# Patient Record
Sex: MALE | Race: WHITE | NOT HISPANIC OR LATINO | Employment: OTHER | URBAN - METROPOLITAN AREA
[De-identification: names, ages, dates, MRNs, and addresses within clinical notes are randomized per-mention and may not be internally consistent; named-entity substitution may affect disease eponyms.]

---

## 2017-05-12 ENCOUNTER — TELEPHONE (OUTPATIENT)
Dept: UROLOGY | Facility: CLINIC | Age: 77
End: 2017-05-12

## 2017-05-12 NOTE — TELEPHONE ENCOUNTER
Reviewed recent path from Dr Diaz  Left a message with Dr Diaz that he should consider re-resection of the area or high grade disease and ask pathologist to specify if there is lamina propria invasion.  I offer to see Mr Marie if he or Mr Marie would like me to see him

## 2017-06-02 ENCOUNTER — HOSPITAL ENCOUNTER (OUTPATIENT)
Dept: RADIOLOGY | Facility: OTHER | Age: 77
Discharge: HOME | End: 2017-06-02
Attending: UROLOGY
Payer: MEDICARE

## 2017-06-02 ENCOUNTER — HOSPITAL ENCOUNTER (OUTPATIENT)
Dept: PREADMISSION TESTING | Facility: OTHER | Age: 77
Discharge: HOME | End: 2017-06-02
Attending: UROLOGY
Payer: MEDICARE

## 2017-06-02 ENCOUNTER — OFFICE VISIT (OUTPATIENT)
Dept: UROLOGY | Facility: CLINIC | Age: 77
End: 2017-06-02
Attending: UROLOGY
Payer: MEDICARE

## 2017-06-02 ENCOUNTER — ANESTHESIA EVENT (OUTPATIENT)
Dept: SURGERY | Facility: OTHER | Age: 77
End: 2017-06-02
Payer: MEDICARE

## 2017-06-02 VITALS
WEIGHT: 205 LBS | DIASTOLIC BLOOD PRESSURE: 78 MMHG | OXYGEN SATURATION: 98 % | HEIGHT: 71 IN | BODY MASS INDEX: 28.7 KG/M2 | RESPIRATION RATE: 18 BRPM | TEMPERATURE: 99 F | HEART RATE: 77 BPM | SYSTOLIC BLOOD PRESSURE: 138 MMHG

## 2017-06-02 VITALS
WEIGHT: 205 LBS | SYSTOLIC BLOOD PRESSURE: 137 MMHG | BODY MASS INDEX: 28.7 KG/M2 | DIASTOLIC BLOOD PRESSURE: 77 MMHG | HEIGHT: 71 IN | HEART RATE: 74 BPM

## 2017-06-02 DIAGNOSIS — C67.5 MALIGNANT NEOPLASM OF BLADDER NECK: ICD-10-CM

## 2017-06-02 DIAGNOSIS — R82.90 ABNORMAL FINDING IN URINE: ICD-10-CM

## 2017-06-02 DIAGNOSIS — C67.5 MALIGNANT NEOPLASM OF BLADDER NECK: Primary | ICD-10-CM

## 2017-06-02 LAB
ANION GAP SERPL CALC-SCNC: 12 MMOL/L
BASOPHILS # BLD AUTO: 0.03 K/UL
BASOPHILS NFR BLD: 0.3 %
BILIRUB SERPL-MCNC: ABNORMAL MG/DL
BLOOD URINE, POC: 50
BUN SERPL-MCNC: 26 MG/DL
CALCIUM SERPL-MCNC: 10.4 MG/DL
CHLORIDE SERPL-SCNC: 109 MMOL/L
CO2 SERPL-SCNC: 24 MMOL/L
COLOR, POC UA: YELLOW
CREAT SERPL-MCNC: 2.1 MG/DL
DIFFERENTIAL METHOD: ABNORMAL
EOSINOPHIL # BLD AUTO: 0.6 K/UL
EOSINOPHIL NFR BLD: 5.9 %
ERYTHROCYTE [DISTWIDTH] IN BLOOD BY AUTOMATED COUNT: 13 %
EST. GFR  (AFRICAN AMERICAN): 34 ML/MIN/1.73 M^2
EST. GFR  (NON AFRICAN AMERICAN): 30 ML/MIN/1.73 M^2
GLUCOSE SERPL-MCNC: 99 MG/DL
GLUCOSE UR QL STRIP: ABNORMAL
HCT VFR BLD AUTO: 44.7 %
HGB BLD-MCNC: 14.8 G/DL
KETONES UR QL STRIP: ABNORMAL
LEUKOCYTE ESTERASE URINE, POC: ABNORMAL
LYMPHOCYTES # BLD AUTO: 3.6 K/UL
LYMPHOCYTES NFR BLD: 36.8 %
MCH RBC QN AUTO: 30.1 PG
MCHC RBC AUTO-ENTMCNC: 33.1 %
MCV RBC AUTO: 91 FL
MONOCYTES # BLD AUTO: 0.8 K/UL
MONOCYTES NFR BLD: 8.7 %
NEUTROPHILS # BLD AUTO: 4.6 K/UL
NEUTROPHILS NFR BLD: 48 %
NITRITE, POC UA: ABNORMAL
PH, POC UA: 7
PLATELET # BLD AUTO: 179 K/UL
PMV BLD AUTO: 9.1 FL
POTASSIUM SERPL-SCNC: 5.2 MMOL/L
PROTEIN, POC: ABNORMAL
RBC # BLD AUTO: 4.92 M/UL
SODIUM SERPL-SCNC: 145 MMOL/L
SPECIFIC GRAVITY, POC UA: 1.01
UROBILINOGEN, POC UA: ABNORMAL
WBC # BLD AUTO: 9.67 K/UL

## 2017-06-02 PROCEDURE — 36415 COLL VENOUS BLD VENIPUNCTURE: CPT

## 2017-06-02 PROCEDURE — 85025 COMPLETE CBC W/AUTO DIFF WBC: CPT

## 2017-06-02 PROCEDURE — 71020 XR CHEST PA AND LATERAL PRE-OP: CPT | Mod: 26,,, | Performed by: RADIOLOGY

## 2017-06-02 PROCEDURE — 80048 BASIC METABOLIC PNL TOTAL CA: CPT

## 2017-06-02 PROCEDURE — 99205 OFFICE O/P NEW HI 60 MIN: CPT | Mod: S$PBB,,, | Performed by: UROLOGY

## 2017-06-02 PROCEDURE — 88112 CYTOPATH CELL ENHANCE TECH: CPT | Performed by: PATHOLOGY

## 2017-06-02 PROCEDURE — 99999 PR PBB SHADOW E&M-EST. PATIENT-LVL IV: CPT | Mod: PBBFAC,,, | Performed by: UROLOGY

## 2017-06-02 PROCEDURE — 71020 XR CHEST PA AND LATERAL PRE-OP: CPT | Mod: TC

## 2017-06-02 RX ORDER — PHENAZOPYRIDINE HYDROCHLORIDE 200 MG/1
200 TABLET, FILM COATED ORAL
COMMUNITY
Start: 2016-12-16 | End: 2017-06-02

## 2017-06-02 RX ORDER — TAMSULOSIN HYDROCHLORIDE 0.4 MG/1
0.4 CAPSULE ORAL DAILY
Refills: 0 | COMMUNITY
Start: 2017-05-15

## 2017-06-02 RX ORDER — PHENAZOPYRIDINE HYDROCHLORIDE 200 MG/1
200 TABLET, FILM COATED ORAL 3 TIMES DAILY
Refills: 0 | Status: ON HOLD | COMMUNITY
Start: 2017-05-12 | End: 2017-06-13 | Stop reason: HOSPADM

## 2017-06-02 RX ORDER — FLUTICASONE FUROATE AND VILANTEROL 100; 25 UG/1; UG/1
1 POWDER RESPIRATORY (INHALATION)
COMMUNITY

## 2017-06-02 RX ORDER — GABAPENTIN 100 MG/1
100 CAPSULE ORAL DAILY
Refills: 0 | COMMUNITY
Start: 2017-05-16 | End: 2017-06-23

## 2017-06-02 RX ORDER — DOXYCYCLINE 100 MG/1
TABLET ORAL
Refills: 0 | COMMUNITY
Start: 2017-03-02 | End: 2017-06-02

## 2017-06-02 RX ORDER — AZELASTINE HYDROCHLORIDE, FLUTICASONE PROPIONATE 137; 50 UG/1; UG/1
1 SPRAY, METERED NASAL
COMMUNITY
Start: 2017-03-20 | End: 2017-06-23

## 2017-06-02 RX ORDER — NAPROXEN SODIUM 220 MG
220 TABLET ORAL 2 TIMES DAILY WITH MEALS
COMMUNITY
End: 2017-06-23

## 2017-06-02 RX ORDER — LIDOCAINE HYDROCHLORIDE 10 MG/ML
1 INJECTION, SOLUTION EPIDURAL; INFILTRATION; INTRACAUDAL; PERINEURAL ONCE
Status: CANCELLED | OUTPATIENT
Start: 2017-06-02 | End: 2017-06-02

## 2017-06-02 RX ORDER — FAMOTIDINE 20 MG/1
20 TABLET, FILM COATED ORAL
Status: CANCELLED | OUTPATIENT
Start: 2017-06-02 | End: 2017-06-02

## 2017-06-02 RX ORDER — OFLOXACIN 3 MG/ML
SOLUTION AURICULAR (OTIC)
Refills: 0 | COMMUNITY
Start: 2017-03-20 | End: 2017-06-02

## 2017-06-02 RX ORDER — SODIUM CHLORIDE, SODIUM LACTATE, POTASSIUM CHLORIDE, CALCIUM CHLORIDE 600; 310; 30; 20 MG/100ML; MG/100ML; MG/100ML; MG/100ML
INJECTION, SOLUTION INTRAVENOUS CONTINUOUS
Status: CANCELLED | OUTPATIENT
Start: 2017-06-02

## 2017-06-02 RX ORDER — ALBUTEROL SULFATE 90 UG/1
AEROSOL, METERED RESPIRATORY (INHALATION)
Refills: 0 | COMMUNITY
Start: 2017-04-05 | End: 2018-08-20

## 2017-06-02 RX ORDER — MOMETASONE FUROATE 50 UG/1
2 SPRAY, METERED NASAL
COMMUNITY
Start: 2016-03-16 | End: 2017-06-02

## 2017-06-02 RX ORDER — ALBUTEROL SULFATE 0.83 MG/ML
2.5 SOLUTION RESPIRATORY (INHALATION)
Status: CANCELLED | OUTPATIENT
Start: 2017-06-02 | End: 2017-06-02

## 2017-06-02 NOTE — DISCHARGE INSTRUCTIONS
PRE-ADMIT TESTING -  [phone]    [address]  [facility]        OUTPATIENT SURGERY UNIT - [phone]    Your surgery has been scheduled at [facility]. We are pleased to have the opportunity to serve you. For Further Information please call [phone].    On the day of surgery please report to the Information Desk on the 1st floor.    · CONTACT YOUR PHYSICIAN'S OFFICE THE DAY PRIOR TO YOUR SURGERY TO OBTAIN YOUR ARRIVAL TIME.     · The evening before surgery do not eat anything after 9 p.m. ( this includes hard candy, chewing gum and mints).  You may only have GATORADE, POWERADE AND WATER  from 9 p.m. until you leave your home.   DO NOT DRINK ANY LIQUIDS ON THE WAY TO THE HOSPITAL.      SPECIAL MEDICATION INSTRUCTIONS: TAKE medications checked off by the Anesthesiologist on your Medication List.    Angiogram Patients: Take medications as instructed by your physician, including aspirin.     Surgery Patients:    If you take ASPIRIN - Your PHYSICIAN/SURGEON will need to inform you IF/OR when you need to stop taking aspirin prior to your surgery.     Do Not take any medications containing IBUPROFEN.  Do Not Wear any make-up or dark nail polish   (especially eye make-up) to surgery. If you come to surgery with makeup on you will be required to remove the makeup or nail polish.    Do not shave your surgical area at least 5 days prior to your surgery. The surgical prep will be performed at the hospital according to Infection Control regulations.    Leave all valuables at home.   Do Not wear any jewelry or watches, including any metal in body piercings.  Contact Lens must be removed before surgery. Either do not wear the contact lens or bring a case and solution for storage.  Please bring a container for eyeglasses or dentures as required.  Bring any paperwork your physician has provided, such as consent forms,  history and physicals, doctor's orders, etc.   Bring comfortable clothes  that are loose fitting to wear upon discharge. Take into consideration the type of surgery being performed.  Maintain your diet as advised per your physician the day prior to surgery.      Adequate rest the night before surgery is advised.   Park in the Parking lot behind the hospital or in the [facility] across the street from the parking lot. Parking is complimentary.  If you will be discharged the same day as your procedure, please arrange for a responsible adult to drive you home or to accompany you if traveling by taxi.   YOU WILL NOT BE PERMITTED TO DRIVE OR TO LEAVE THE HOSPITAL ALONE AFTER SURGERY.   It is strongly recommended that you arrange for someone to remain with you for the first 24 hrs following your surgery.       Thank you for your cooperation.  The Staff of [facility].        Bathing Instructions                                                                 Please shower the evening before and morning of your procedure with    ANTIBACTERIAL SOAP. ( DIAL, etc )  Concentrate on the surgical area   for at least 3 minutes and rinse completely. Dry off as usual.   Do not use any deodorant, powder, body lotions, perfume, after shave or    cologne.

## 2017-06-02 NOTE — LETTER
June 2, 2017      MILADY Diaz III, MD  [address]  [address]  [facility] [address]           [facility] - Urology  [address] [place] [address], [address] [address]  Phone: [phone]          Patient: Connor Marie   MR Number: [id number]   YOB: 1940   Date of Visit: 6/2/2017       Dear Dr. MILADY Diaz III:    Thank you for referring Connor Marie to me for evaluation. Attached you will find relevant portions of my assessment and plan of care.    If you have questions, please do not hesitate to call me. I look forward to following Connor Marie along with you.    Sincerely,    Blas Brennan MD    Enclosure  CC:  No Recipients    If you would like to receive this communication electronically, please contact [email or web address] or [phone] to request more information on [employer] access.    For providers and/or their staff who would like to refer a patient to [facility], please contact us through our one-stop-shop provider referral line, [facility], at [phone].    If you feel you have received this communication in error or would no longer like to receive these types of communications, please e-mail [email or web address]

## 2017-06-02 NOTE — ANESTHESIA PREPROCEDURE EVALUATION
06/02/2017  Connor Marie is a 76 y.o., male.    Anesthesia Evaluation    I have reviewed the Patient Summary Reports.    I have reviewed the Nursing Notes.   I have reviewed the Medications.     Review of Systems  Anesthesia Hx:  Denies Family Hx of Anesthesia complications.   Denies Personal Hx of Anesthesia complications.   Social:  Former Smoker Quit 3 years   Hematology/Oncology:  Hematology Normal      Current/Recent Cancer. (prostate) --  Cancer in past history (s/p nephrectomy for renal CA):    EENT/Dental:EENT/Dental Normal   Cardiovascular:  Cardiovascular Normal Exercise tolerance: poor  Exercise limited by back pain   Pulmonary:   COPD (rescue inhaler 1-2x/week, non-compliant with BREO, told to take daily between now and surgery)    Renal/:  Renal/ Normal     Hepatic/GI:  Hepatic/GI Normal    Musculoskeletal:  Spine Disorders: lumbar Chronic Pain    Neurological:  Neurology Normal    Endocrine:   Diabetes, type 2    Dermatological:  Skin Normal    Psych:  Psychiatric Normal           Physical Exam  General:  Well nourished    Airway/Jaw/Neck:  Airway Findings: Mouth Opening: Normal Mallampati: II  TM Distance: Normal, at least 6 cm  Jaw/Neck Findings:  Neck ROM: Normal ROM      Dental:  Dental Findings: In tact, upper front caps        Mental Status:  Mental Status Findings:  Cooperative, Alert and Oriented         Anesthesia Plan  Type of Anesthesia, risks & benefits discussed:  Anesthesia Type:  general  Patient's Preference:   Intra-op Monitoring Plan: standard ASA monitors  Intra-op Monitoring Plan Comments:   Post Op Pain Control Plan:   Post Op Pain Control Plan Comments:   Induction:    Beta Blocker:         Informed Consent: Patient understands risks and agrees with Anesthesia plan.  Questions answered. Anesthesia consent signed with patient.  ASA Score: 3     Day of Surgery  Review of History & Physical:    H&P update referred to the surgeon.     Anesthesia Plan Notes: Outside labs sent for        Ready For Surgery From Anesthesia Perspective.

## 2017-06-02 NOTE — PATIENT INSTRUCTIONS
Transurethral Resection of the Prostate (TURP): Hospital Recovery    After surgery, youll first go to the recovery room, then to a regular hospital room.  In most cases, you wont go home until you can pass urine on your own. The hospital stay is usually 1-7 days.  Having a catheter  · To cleanse your bladder and prevent blood clots, a catheter is placed in your urethra. Fluid then flows into and out of your bladder through the tube. The fluid draining from the tube will be reddish. This is nothing to worry about.  · The catheter will usually remain in place for 1-3 days. While it is in, you may feel like you have to urinate. You may also feel cramps in your bladder. If the cramping bothers you or you are in pain, tell the nurse. He or she may be able to give you medication to help you feel better.  Urinating on your own  · In 1-3 days after surgery, the catheter is removed. This is done to see if you can urinate on your own.  · It is common to feel a burning sensation when you first pass urine. Also, the urine may still look reddish or pinkish. Tell the nurse if your urine is bright red.  · The amount you urinate may be measured. In most cases, you will go home when you can pass urine without the catheter. If you cant urinate on your own, you may go home with the tube still in place and a leg bag to collect the urine. In this case, youll return to the doctor later on to have the catheter removed.  Before going home  You will be told what to do while you heal. You may be given certain medications, such as antibiotics to prevent infection. Ask your doctor when you can start taking aspirin and other medications again. When you are ready to go home, have an adult friend or relative drive you.   Date Last Reviewed: 9/21/2014  © 0449-3601 [employer]. [address] [place] [address], [place] [address]. All rights reserved. This information is not intended as a substitute for professional medical care. Always  follow your healthcare professional's instructions.

## 2017-06-03 LAB — BACTERIA UR CULT: NO GROWTH

## 2017-06-05 NOTE — PLAN OF CARE
06/05/17 1252   ED Admissions Case Approval   ED Admissions Case Approval (!) CM Approved  (OP )     THIS IS AN OP PROCEDURE. PLEASE ADMIT OUTPATIENT

## 2017-06-12 ENCOUNTER — ANESTHESIA (OUTPATIENT)
Dept: SURGERY | Facility: OTHER | Age: 77
End: 2017-06-12
Payer: MEDICARE

## 2017-06-12 ENCOUNTER — HOSPITAL ENCOUNTER (OUTPATIENT)
Facility: OTHER | Age: 77
Discharge: HOME | End: 2017-06-13
Attending: UROLOGY | Admitting: UROLOGY
Payer: MEDICARE

## 2017-06-12 DIAGNOSIS — C67.5 MALIGNANT NEOPLASM OF BLADDER NECK: Primary | ICD-10-CM

## 2017-06-12 LAB
ANION GAP SERPL CALC-SCNC: 10 MMOL/L
BUN SERPL-MCNC: 26 MG/DL
CALCIUM SERPL-MCNC: 10.1 MG/DL
CHLORIDE SERPL-SCNC: 110 MMOL/L
CO2 SERPL-SCNC: 23 MMOL/L
CREAT SERPL-MCNC: 2.2 MG/DL
EST. GFR  (AFRICAN AMERICAN): 32 ML/MIN/1.73 M^2
EST. GFR  (NON AFRICAN AMERICAN): 28 ML/MIN/1.73 M^2
GLUCOSE SERPL-MCNC: 100 MG/DL
POCT GLUCOSE: 180 MG/DL (ref 70–110)
POCT GLUCOSE: 99 MG/DL (ref 70–110)
POTASSIUM SERPL-SCNC: 5.4 MMOL/L
SODIUM SERPL-SCNC: 143 MMOL/L

## 2017-06-12 PROCEDURE — 99900035 HC TECH TIME PER 15 MIN (STAT)

## 2017-06-12 PROCEDURE — 27201423 OPTIME MED/SURG SUP & DEVICES STERILE SUPPLY: Performed by: UROLOGY

## 2017-06-12 PROCEDURE — 25000003 PHARM REV CODE 250: Performed by: ANESTHESIOLOGY

## 2017-06-12 PROCEDURE — 74420 UROGRAPHY RTRGR +-KUB: CPT | Mod: 26,,, | Performed by: UROLOGY

## 2017-06-12 PROCEDURE — 37000009 HC ANESTHESIA EA ADD 15 MINS: Performed by: UROLOGY

## 2017-06-12 PROCEDURE — 25000242 PHARM REV CODE 250 ALT 637 W/ HCPCS: Performed by: ANESTHESIOLOGY

## 2017-06-12 PROCEDURE — 36000707: Performed by: UROLOGY

## 2017-06-12 PROCEDURE — C1758 CATHETER, URETERAL: HCPCS | Performed by: UROLOGY

## 2017-06-12 PROCEDURE — 71000039 HC RECOVERY, EACH ADD'L HOUR: Performed by: UROLOGY

## 2017-06-12 PROCEDURE — C1769 GUIDE WIRE: HCPCS | Performed by: UROLOGY

## 2017-06-12 PROCEDURE — 25000003 PHARM REV CODE 250: Performed by: NURSE ANESTHETIST, CERTIFIED REGISTERED

## 2017-06-12 PROCEDURE — 71000033 HC RECOVERY, INTIAL HOUR: Performed by: UROLOGY

## 2017-06-12 PROCEDURE — 88305 TISSUE EXAM BY PATHOLOGIST: CPT | Performed by: PATHOLOGY

## 2017-06-12 PROCEDURE — 25000003 PHARM REV CODE 250: Performed by: UROLOGY

## 2017-06-12 PROCEDURE — 94640 AIRWAY INHALATION TREATMENT: CPT

## 2017-06-12 PROCEDURE — 37000008 HC ANESTHESIA 1ST 15 MINUTES: Performed by: UROLOGY

## 2017-06-12 PROCEDURE — 82962 GLUCOSE BLOOD TEST: CPT | Performed by: UROLOGY

## 2017-06-12 PROCEDURE — 25500020 PHARM REV CODE 255: Performed by: UROLOGY

## 2017-06-12 PROCEDURE — 52005 CYSTO W/URTRL CATHJ: CPT | Mod: 59,,, | Performed by: UROLOGY

## 2017-06-12 PROCEDURE — 63600175 PHARM REV CODE 636 W HCPCS: Performed by: NURSE ANESTHETIST, CERTIFIED REGISTERED

## 2017-06-12 PROCEDURE — 36000706: Performed by: UROLOGY

## 2017-06-12 PROCEDURE — 80048 BASIC METABOLIC PNL TOTAL CA: CPT

## 2017-06-12 PROCEDURE — 88305 TISSUE EXAM BY PATHOLOGIST: CPT | Mod: 26,,, | Performed by: PATHOLOGY

## 2017-06-12 PROCEDURE — 94761 N-INVAS EAR/PLS OXIMETRY MLT: CPT

## 2017-06-12 PROCEDURE — 63600175 PHARM REV CODE 636 W HCPCS: Performed by: UROLOGY

## 2017-06-12 PROCEDURE — 52235 CYSTOSCOPY AND TREATMENT: CPT | Mod: ,,, | Performed by: UROLOGY

## 2017-06-12 RX ORDER — ALBUTEROL SULFATE 0.83 MG/ML
2.5 SOLUTION RESPIRATORY (INHALATION)
Status: COMPLETED | OUTPATIENT
Start: 2017-06-12 | End: 2017-06-12

## 2017-06-12 RX ORDER — DIPHENHYDRAMINE HYDROCHLORIDE 50 MG/ML
25 INJECTION INTRAMUSCULAR; INTRAVENOUS EVERY 6 HOURS PRN
Status: DISCONTINUED | OUTPATIENT
Start: 2017-06-12 | End: 2017-06-12 | Stop reason: HOSPADM

## 2017-06-12 RX ORDER — INSULIN ASPART 100 [IU]/ML
0-5 INJECTION, SOLUTION INTRAVENOUS; SUBCUTANEOUS
Status: DISCONTINUED | OUTPATIENT
Start: 2017-06-12 | End: 2017-06-13 | Stop reason: HOSPADM

## 2017-06-12 RX ORDER — ONDANSETRON 2 MG/ML
4 INJECTION INTRAMUSCULAR; INTRAVENOUS ONCE AS NEEDED
Status: DISCONTINUED | OUTPATIENT
Start: 2017-06-12 | End: 2017-06-12 | Stop reason: HOSPADM

## 2017-06-12 RX ORDER — FLUTICASONE FUROATE AND VILANTEROL 100; 25 UG/1; UG/1
1 POWDER RESPIRATORY (INHALATION) DAILY
Status: DISCONTINUED | OUTPATIENT
Start: 2017-06-12 | End: 2017-06-13 | Stop reason: HOSPADM

## 2017-06-12 RX ORDER — GABAPENTIN 100 MG/1
100 CAPSULE ORAL DAILY
Status: DISCONTINUED | OUTPATIENT
Start: 2017-06-12 | End: 2017-06-13 | Stop reason: HOSPADM

## 2017-06-12 RX ORDER — TAMSULOSIN HYDROCHLORIDE 0.4 MG/1
0.4 CAPSULE ORAL DAILY
Status: DISCONTINUED | OUTPATIENT
Start: 2017-06-12 | End: 2017-06-13 | Stop reason: HOSPADM

## 2017-06-12 RX ORDER — PROPOFOL 10 MG/ML
VIAL (ML) INTRAVENOUS
Status: DISCONTINUED | OUTPATIENT
Start: 2017-06-12 | End: 2017-06-12

## 2017-06-12 RX ORDER — FAMOTIDINE 20 MG/1
20 TABLET, FILM COATED ORAL
Status: COMPLETED | OUTPATIENT
Start: 2017-06-12 | End: 2017-06-12

## 2017-06-12 RX ORDER — HYDROMORPHONE HYDROCHLORIDE 2 MG/ML
0.4 INJECTION, SOLUTION INTRAMUSCULAR; INTRAVENOUS; SUBCUTANEOUS EVERY 5 MIN PRN
Status: DISCONTINUED | OUTPATIENT
Start: 2017-06-12 | End: 2017-06-12 | Stop reason: HOSPADM

## 2017-06-12 RX ORDER — GLYCOPYRROLATE 0.2 MG/ML
INJECTION INTRAMUSCULAR; INTRAVENOUS
Status: DISCONTINUED | OUTPATIENT
Start: 2017-06-12 | End: 2017-06-12

## 2017-06-12 RX ORDER — FENTANYL CITRATE 50 UG/ML
25 INJECTION, SOLUTION INTRAMUSCULAR; INTRAVENOUS EVERY 5 MIN PRN
Status: DISCONTINUED | OUTPATIENT
Start: 2017-06-12 | End: 2017-06-12 | Stop reason: HOSPADM

## 2017-06-12 RX ORDER — NEOSTIGMINE METHYLSULFATE 1 MG/ML
INJECTION, SOLUTION INTRAVENOUS
Status: DISCONTINUED | OUTPATIENT
Start: 2017-06-12 | End: 2017-06-12

## 2017-06-12 RX ORDER — ACETAMINOPHEN 10 MG/ML
INJECTION, SOLUTION INTRAVENOUS
Status: DISCONTINUED | OUTPATIENT
Start: 2017-06-12 | End: 2017-06-12

## 2017-06-12 RX ORDER — MEPERIDINE HYDROCHLORIDE 50 MG/ML
12.5 INJECTION INTRAMUSCULAR; INTRAVENOUS; SUBCUTANEOUS ONCE AS NEEDED
Status: DISCONTINUED | OUTPATIENT
Start: 2017-06-12 | End: 2017-06-12 | Stop reason: HOSPADM

## 2017-06-12 RX ORDER — ATROPA BELLADONNA AND OPIUM 16.2; 6 MG/1; MG/1
60 SUPPOSITORY RECTAL EVERY 6 HOURS PRN
Status: DISCONTINUED | OUTPATIENT
Start: 2017-06-12 | End: 2017-06-13 | Stop reason: HOSPADM

## 2017-06-12 RX ORDER — ROCURONIUM BROMIDE 10 MG/ML
INJECTION, SOLUTION INTRAVENOUS
Status: DISCONTINUED | OUTPATIENT
Start: 2017-06-12 | End: 2017-06-12

## 2017-06-12 RX ORDER — MIDAZOLAM HYDROCHLORIDE 1 MG/ML
INJECTION INTRAMUSCULAR; INTRAVENOUS
Status: DISCONTINUED | OUTPATIENT
Start: 2017-06-12 | End: 2017-06-12

## 2017-06-12 RX ORDER — LIDOCAINE HYDROCHLORIDE 10 MG/ML
INJECTION, SOLUTION INTRAVENOUS
Status: DISCONTINUED | OUTPATIENT
Start: 2017-06-12 | End: 2017-06-12

## 2017-06-12 RX ORDER — SODIUM CHLORIDE, SODIUM LACTATE, POTASSIUM CHLORIDE, CALCIUM CHLORIDE 600; 310; 30; 20 MG/100ML; MG/100ML; MG/100ML; MG/100ML
INJECTION, SOLUTION INTRAVENOUS CONTINUOUS
Status: DISCONTINUED | OUTPATIENT
Start: 2017-06-12 | End: 2017-06-12

## 2017-06-12 RX ORDER — CIPROFLOXACIN 2 MG/ML
400 INJECTION, SOLUTION INTRAVENOUS
Status: COMPLETED | OUTPATIENT
Start: 2017-06-12 | End: 2017-06-13

## 2017-06-12 RX ORDER — LIDOCAINE HYDROCHLORIDE 10 MG/ML
1 INJECTION, SOLUTION EPIDURAL; INFILTRATION; INTRACAUDAL; PERINEURAL ONCE
Status: DISCONTINUED | OUTPATIENT
Start: 2017-06-12 | End: 2017-06-12 | Stop reason: HOSPADM

## 2017-06-12 RX ORDER — ALBUTEROL SULFATE 90 UG/1
1 AEROSOL, METERED RESPIRATORY (INHALATION) EVERY 4 HOURS PRN
Status: DISCONTINUED | OUTPATIENT
Start: 2017-06-12 | End: 2017-06-13 | Stop reason: HOSPADM

## 2017-06-12 RX ORDER — HYDROCODONE BITARTRATE AND ACETAMINOPHEN 5; 325 MG/1; MG/1
1 TABLET ORAL EVERY 4 HOURS PRN
Status: DISCONTINUED | OUTPATIENT
Start: 2017-06-12 | End: 2017-06-13 | Stop reason: HOSPADM

## 2017-06-12 RX ORDER — SODIUM CHLORIDE 0.9 % (FLUSH) 0.9 %
3 SYRINGE (ML) INJECTION
Status: DISCONTINUED | OUTPATIENT
Start: 2017-06-12 | End: 2017-06-13 | Stop reason: HOSPADM

## 2017-06-12 RX ORDER — IBUPROFEN 200 MG
24 TABLET ORAL
Status: DISCONTINUED | OUTPATIENT
Start: 2017-06-12 | End: 2017-06-13 | Stop reason: HOSPADM

## 2017-06-12 RX ORDER — OXYCODONE HYDROCHLORIDE 5 MG/1
5 TABLET ORAL
Status: DISCONTINUED | OUTPATIENT
Start: 2017-06-12 | End: 2017-06-12 | Stop reason: HOSPADM

## 2017-06-12 RX ORDER — SODIUM CHLORIDE 0.9 % (FLUSH) 0.9 %
3 SYRINGE (ML) INJECTION EVERY 8 HOURS
Status: DISCONTINUED | OUTPATIENT
Start: 2017-06-12 | End: 2017-06-12 | Stop reason: HOSPADM

## 2017-06-12 RX ORDER — SODIUM CHLORIDE 0.9 % (FLUSH) 0.9 %
3 SYRINGE (ML) INJECTION
Status: DISCONTINUED | OUTPATIENT
Start: 2017-06-12 | End: 2017-06-12

## 2017-06-12 RX ORDER — ONDANSETRON 2 MG/ML
INJECTION INTRAMUSCULAR; INTRAVENOUS
Status: DISCONTINUED | OUTPATIENT
Start: 2017-06-12 | End: 2017-06-12

## 2017-06-12 RX ORDER — IBUPROFEN 200 MG
16 TABLET ORAL
Status: DISCONTINUED | OUTPATIENT
Start: 2017-06-12 | End: 2017-06-13 | Stop reason: HOSPADM

## 2017-06-12 RX ORDER — FENTANYL CITRATE 50 UG/ML
INJECTION, SOLUTION INTRAMUSCULAR; INTRAVENOUS
Status: DISCONTINUED | OUTPATIENT
Start: 2017-06-12 | End: 2017-06-12

## 2017-06-12 RX ORDER — HYDROCODONE BITARTRATE AND ACETAMINOPHEN 10; 325 MG/1; MG/1
1 TABLET ORAL EVERY 4 HOURS PRN
Status: DISCONTINUED | OUTPATIENT
Start: 2017-06-12 | End: 2017-06-13 | Stop reason: HOSPADM

## 2017-06-12 RX ORDER — CIPROFLOXACIN 2 MG/ML
INJECTION, SOLUTION INTRAVENOUS
Status: DISCONTINUED | OUTPATIENT
Start: 2017-06-12 | End: 2017-06-12

## 2017-06-12 RX ORDER — GLUCAGON 1 MG
1 KIT INJECTION
Status: DISCONTINUED | OUTPATIENT
Start: 2017-06-12 | End: 2017-06-13 | Stop reason: HOSPADM

## 2017-06-12 RX ORDER — PHENYLEPHRINE HYDROCHLORIDE 10 MG/ML
INJECTION INTRAVENOUS
Status: DISCONTINUED | OUTPATIENT
Start: 2017-06-12 | End: 2017-06-12

## 2017-06-12 RX ORDER — SODIUM CHLORIDE 9 MG/ML
INJECTION, SOLUTION INTRAVENOUS CONTINUOUS
Status: DISCONTINUED | OUTPATIENT
Start: 2017-06-12 | End: 2017-06-13

## 2017-06-12 RX ADMIN — ACETAMINOPHEN 1000 MG: 10 INJECTION, SOLUTION INTRAVENOUS at 10:06

## 2017-06-12 RX ADMIN — PHENYLEPHRINE HYDROCHLORIDE 100 MCG: 10 INJECTION INTRAVENOUS at 09:06

## 2017-06-12 RX ADMIN — SITAGLIPTIN 25 MG: 25 TABLET, FILM COATED ORAL at 04:06

## 2017-06-12 RX ADMIN — PROPOFOL 40 MG: 10 INJECTION, EMULSION INTRAVENOUS at 10:06

## 2017-06-12 RX ADMIN — PROPOFOL 200 MG: 10 INJECTION, EMULSION INTRAVENOUS at 09:06

## 2017-06-12 RX ADMIN — ONDANSETRON 4 MG: 2 INJECTION INTRAMUSCULAR; INTRAVENOUS at 10:06

## 2017-06-12 RX ADMIN — GLYCOPYRROLATE 0.4 MG: 0.2 INJECTION, SOLUTION INTRAMUSCULAR; INTRAVENOUS at 10:06

## 2017-06-12 RX ADMIN — NEOSTIGMINE METHYLSULFATE 3 MG: 1 INJECTION INTRAVENOUS at 10:06

## 2017-06-12 RX ADMIN — ATROPA BELLADONNA AND OPIUM 60 MG: 16.2; 6 SUPPOSITORY RECTAL at 11:06

## 2017-06-12 RX ADMIN — SODIUM CHLORIDE: 0.9 INJECTION, SOLUTION INTRAVENOUS at 02:06

## 2017-06-12 RX ADMIN — EPHEDRINE SULFATE 10 MG: 50 INJECTION INTRAMUSCULAR; INTRAVENOUS; SUBCUTANEOUS at 10:06

## 2017-06-12 RX ADMIN — TAMSULOSIN HYDROCHLORIDE 0.4 MG: 0.4 CAPSULE ORAL at 03:06

## 2017-06-12 RX ADMIN — GABAPENTIN 100 MG: 100 CAPSULE ORAL at 03:06

## 2017-06-12 RX ADMIN — CIPROFLOXACIN 400 MG: 2 INJECTION, SOLUTION INTRAVENOUS at 09:06

## 2017-06-12 RX ADMIN — FENTANYL CITRATE 100 MCG: 50 INJECTION, SOLUTION INTRAMUSCULAR; INTRAVENOUS at 09:06

## 2017-06-12 RX ADMIN — ROCURONIUM BROMIDE 40 MG: 10 INJECTION, SOLUTION INTRAVENOUS at 09:06

## 2017-06-12 RX ADMIN — FAMOTIDINE 20 MG: 20 TABLET, FILM COATED ORAL at 08:06

## 2017-06-12 RX ADMIN — MIDAZOLAM HYDROCHLORIDE 2 MG: 1 INJECTION, SOLUTION INTRAMUSCULAR; INTRAVENOUS at 09:06

## 2017-06-12 RX ADMIN — SODIUM CHLORIDE, SODIUM LACTATE, POTASSIUM CHLORIDE, AND CALCIUM CHLORIDE: 600; 310; 30; 20 INJECTION, SOLUTION INTRAVENOUS at 09:06

## 2017-06-12 RX ADMIN — LIDOCAINE HYDROCHLORIDE 80 MG: 10 INJECTION, SOLUTION INTRAVENOUS at 09:06

## 2017-06-12 RX ADMIN — EPHEDRINE SULFATE 10 MG: 50 INJECTION INTRAMUSCULAR; INTRAVENOUS; SUBCUTANEOUS at 09:06

## 2017-06-12 RX ADMIN — ALBUTEROL SULFATE 2.5 MG: 2.5 SOLUTION RESPIRATORY (INHALATION) at 07:06

## 2017-06-12 RX ADMIN — CIPROFLOXACIN 400 MG: 2 INJECTION, SOLUTION INTRAVENOUS at 10:06

## 2017-06-12 RX ADMIN — SODIUM CHLORIDE: 0.9 INJECTION, SOLUTION INTRAVENOUS at 11:06

## 2017-06-12 NOTE — PROGRESS NOTES
gu post op  Discussed operative findings  Af vss  Ab soft  Bladder not distended  Urine clear on slow CBI  Will cont CBI  Consult hosp med and ask them to assist with DM management and will also ask them to review home meds  Pt may require 2 nights in hospital; will reassess tomorrow  Cont cipro until reyes is removed  Will get CT chest as outpatient

## 2017-06-12 NOTE — H&P (VIEW-ONLY)
Subjective:      Connor Marie is a 76 y.o. male who was referred by Dr LENY Diaz for evaluation of urothelial cancer.    The patient saw me many years ago at [facility].  These old records are not available at this time.  He tells me that he underwent a right laparoscopic nephroureterectomy by Dr. Balta Street at [facility] in September 2014.  He did very well with the surgery.  He tells me that he developed a recurrence in the bladder postoperatively.  Dr. Starks treated this with resection and induction BCG (6 weekly doses) followed by 2 cycles of 3 dose BCG maintenance.    Since then he has followed up with Dr. Papa Diaz.  He has had 4 positive bladder biopsies of the last year.  During this time.  He had induction with 4 weekly doses of BCG followed by 8 doses of BCG once monthly.    Dr. Diaz called me and is concerned that he may have a high-grade invasive tumor at the bladder neck/prostate.  He is asked me to reevaluate him and consider if additional treatment is necessary.         The following portions of the patient's history were reviewed and updated as appropriate: allergies, current medications, past family history, past medical history, past social history, past surgical history and problem list.    Review of Systems  Constitutional: no fever or chills  ENT: no nasal congestion or sore throat  Respiratory: no cough or shortness of breath  Cardiovascular: no chest pain or palpitations  Gastrointestinal: no nausea or vomiting, tolerating diet  Genitourinary: as per HPI  Hematologic/Lymphatic: no easy bruising or lymphadenopathy  Musculoskeletal: no arthralgias or myalgias  Neurological: no seizures or tremors  Behavioral/Psych: no auditory or visual hallucinations,  History of significant depression.  He was previously on Wellbutrin but this causes aggressiveness.  He also has chronic pain for which she is undergoing injections     Objective:   Vitals: /77 (BP Location: Right arm, Patient  "Position: Sitting, BP Method: Automatic)   Pulse 74   Ht 5' 11" (1.803 m)   Wt 93 kg (205 lb)   BMI 28.59 kg/m²     Physical Exam   General: alert and oriented, no acute distress  Head: normocephalic, atraumatic  Neck: supple, no lymphadenopathy, normal ROM, no masses  Respiratory: clear to auscultation, no wheezes or rales and Symmetric expansion, non-labored breathing  Cardiovascular: regular rate and rhythm, no murmurs, nomal pulses, no peripheral edema  Abdomen:  Well-healed periumbilical extraction site incision and soft, non tender, non distended, no palpable masses, no hernias, no hepatomegaly or splenomegaly  Lymphatic: no inguinal nodes  Skin: normal coloration and turgor, no rashes, no suspicious skin lesions noted  Neuro:  and alert and oriented x3, no gross deficits  Psych: Somewhat emotional and tearful during the examination    Physical Exam    Lab Review   Urinalysis demonstrates nitrite negative positive white cells and positive red blood cells    Pathology  April 25th 2016 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 Diaz this was at the bladder neck)    May 8, 2017 low-grade TA (per my conversation with Dr. Diaz this was also at the bladder neck    No results found for: WBC, HGB, HCT, MCV, PLT  No results found for: CREATININE, BUN  No results found for: PSA  Imaging  None available for review at this time  Assessment:     1. 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 Abnormal finding in urine       -  Plan:     Orders Placed This Encounter    Urine culture    X-Ray Chest PA And Lateral    POCT URINE DIPSTICK WITHOUT MICROSCOPE     Schedule examination under anesthesia with staging TURP plus minus bladder biopsy and retrograde pyelogram  We discussed the risk and benefits of the above with the patient and I answered all his " questions.    60 min face to face; more than 50% time spent counseling and coordinating care

## 2017-06-12 NOTE — PLAN OF CARE
Problem: Patient Care Overview  Goal: Plan of Care Review  Outcome: Ongoing (interventions implemented as appropriate)  Patient on RA sat's 94% with no distress MDI not given pt had dosage today at home will start in AM.

## 2017-06-12 NOTE — INTERVAL H&P NOTE
The patient has been examined and the H&P has been reviewed:    cs neg.  CXR with ? area.  Will get CT prior to next visit as recommended by radiologist    Anesthesia/Surgery risks, benefits and alternative options discussed and understood by patient/family.          Active Hospital Problems    Diagnosis  POA    Malignant neoplasm of bladder neck [C67.5]  Yes      Resolved Hospital Problems    Diagnosis Date Resolved POA   No resolved problems to display.

## 2017-06-12 NOTE — INTERVAL H&P NOTE
The patient has been examined and the H&P has been reviewed:    Pt says he has had multiple spontaneous pneumothoraces; has had previous imaging and has chronic scarring    Anesthesia/Surgery risks, benefits and alternative options discussed and understood by patient/family.          Active Hospital Problems    Diagnosis  POA    Malignant neoplasm of bladder neck [C67.5]  Yes      Resolved Hospital Problems    Diagnosis Date Resolved POA   No resolved problems to display.

## 2017-06-12 NOTE — OP NOTE
DATE OF PROCEDURE:  06/12/2017    SURGEON:  Blas Brennan M.D.    PROCEDURES:  1.  Transurethral resection of bladder tumor, medium.  2.  Transurethral resection of the prostate for staging.  3.  Cystoscopy with examination under anesthesia and left retrograde pyelogram.    PREOPERATIVE DIAGNOSES:  1.  Urothelial cancer of the bladder with recurrence while on maintenance BCG.  2.  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    ANESTHESIA:  General.    COMPLICATIONS:  None.    HISTORY:  Mr. Marie is a 76-year-old man.  He underwent a right   nephroureterectomy by another urologist approximately 3 years ago for urothelial   cancer of the right upper tracts.  He had a recurrence in the bladder.  This   has been resected and he has been treated with 2 courses of induction BCG   initially by [facility] and then by Dr. Vikas Diaz.  Dr. Diaz tells me that he has had 4 recurrences within the last year despite   maintenance BCG.  Most recently, he had a high-grade recurrence on the right   side of the trigone and 2 low-grade recurrences in the prostatic urethra, one on   the right lateral lobe of the prostate and the other just to the left of the   verumontanum.  He presents for repeat staging.  Chest x-ray shows a lesion in   the right upper portion of the lung.  The patient has had multiple spontaneous   pneumothoraces; however, we are ordering a CT scan to work this up further.    Urine cytology shows only atypia.  Preop urine culture is negative.    PROCEDURE IN DETAIL:  Preop antibiotics were administered and TEDs and SCDs were   applied.  The patient was brought to the Cysto Suite and undergoes general   anesthesia.  He was placed in the dorsal lithotomy position.  Bimanual   examination was performed.  The prostate was indurated and somewhat nodular, but   not grossly enlarged.  There were no pelvic masses palpable.  Sterile prep and   drape was  performed.  Cystoscopy was performed with 30 and 70-degree lenses. The   external urinary sphincter and verumontanum were intact.  There were noted to   be areas of previous biopsy/resection on both sides of the verumontanum and on   the right side of the trigone.  The right ureteral orifice was surgically   absent.  There was some erythema adjacent to the previous biopsy/resection site.    The left ureteral orifice was identified and there was clear efflux.    Utilizing narrow band imaging, 2 additional lesions were identified, one was a   small papillary lesion on the posterior wall of the bladder and the other was a   flat lesion on the left lateral wall of the bladder.  A left retrograde   pyelogram was performed.  The ureter was normal in course and caliber.  There   was no hydronephrosis and no filling defects in the left kidney or ureter.  The   left side drained promptly and completely.  The cystoscope was removed and the   resectoscope was advanced into the bladder.  The area of previous resection in   the trigone was resected along with the adjacent area of erythema.  These chips   were sent labeled as right trigone.  Following this, the right side of the   prostatic fossa was again resected, some bleeding was encountered and   cauterized.  On this side, these chips were sent labeled right lateral lobe of   the prostate.  Following this, the left side of the prostate was resected.  Care   was taken to preserve the external urinary sphincter and the left ureteral   orifice throughout the resection.  The left side was resected and sent to the   Laboratory labeled left prostate.  Following this, the lesion on the posterior   wall was resected.  This lesion was resected clearly into the muscle of the   bladder wall.  There was no evidence of perforation.  The margins of resection   were fulgurated.  Lastly, the flat lesion on the left lateral wall of the   bladder was resected, it too was resected into the  muscular wall of the bladder   and fortunately there was no perforation.  Each of these lesions were sent   separately labeled posterior wall and left lateral wall of the bladder   respectively.  At the end of the procedure with the bladder decompressed, there   was no bleeding.  Because we resected the prostate and had opened some venous   sinuses, I chose not to administer mitomycin.  The bladder was decompressed.    The cystoscope was removed and a 24-Thai 3-way Rodgers catheter was placed, 30   mL were placed in the balloon.  The catheter irrigates clearly and was connected   to continuous irrigation.  The patient's bladder and abdomen remained soft and   nondistended throughout the case.  He tolerated the procedure well.      ZOYA/  dd: 06/12/2017 11:20:16 (CDT)  td: 06/12/2017 13:31:41 (CDT)  Doc ID   #[id number]  Job ID #[id number]    CC:

## 2017-06-12 NOTE — PLAN OF CARE
Discharge Planning:  Patient admitted on 6-12-17  LOS-day 0  Chart reviewed  Care plan discussed  Discussed care plan with treatment team    Discussed care plan with the attending Dr Brennan  Current dispo - home soon (Emma Patterson)  Case management  to follow with you  Consults following are: case mgt    MOON form completed and signed by Mr Bakery, all    questions answered.

## 2017-06-12 NOTE — PLAN OF CARE
06/12/17 0938   ED Admissions Case Approval   ED Admissions Case Approval (!) CM Approved  (OP )   OUTPATIENT ONLY. 19405 OUTPATIENT PROCEDURE CODE.   PLEASE PLACE PATIENT IN OUTPATIENT EXT POST SURGERY.

## 2017-06-12 NOTE — TRANSFER OF CARE
"Anesthesia Transfer of Care Note    Patient: Connor Marie    Procedure(s) Performed: Procedure(s) (LRB):  PROSTATECTOMY-TRANSURETHRAL; cysto bladder biopsy; left retrograde pyelogram (Left)  TUMOR-BLADDER-TRANSURETHRAL RESECTION    Patient location: PACU    Anesthesia Type: general    Transport from OR: Transported from OR on 2-3 L/min O2 by NC with adequate spontaneous ventilation    Post pain: adequate analgesia    Post assessment: no apparent anesthetic complications    Post vital signs: stable    Level of consciousness: awake    Nausea/Vomiting: no nausea/vomiting    Complications: none    Transfer of care protocol was followed      Last vitals:   Visit Vitals  BP (!) 140/84 (BP Method: Automatic)   Pulse 68   Temp 36.5 °C (97.7 °F) (Oral)   Resp 18   Ht 5' 11" (1.803 m)   Wt 93 kg (205 lb)   SpO2 95%   BMI 28.59 kg/m²     "

## 2017-06-12 NOTE — PLAN OF CARE
06/12/17 1453   Discharge Assessment   Assessment Type Discharge Planning Assessment   Confirmed/corrected address and phone number on facesheet? Yes   Assessment information obtained from? Patient;Caregiver;Medical Record   Prior to hospitilization cognitive status: Alert/Oriented   Prior to hospitalization functional status: Independent   Current cognitive status: Alert/Oriented   Current Functional Status: Independent   Arrived From home or self-care   Lives With spouse   Able to Return to Prior Arrangements yes   Is patient able to care for self after discharge? Yes   How many people do you have in your home that can help with your care after discharge? 1   Patient currently being followed by outpatient case management? No   Patient currently receives home health services? No   Does the patient currently use HME? No   Patient currently receives private duty nursing? N/A   Patient currently receives any other outside agency services? No   Equipment Currently Used at Home none   Do you have any problems affording any of your prescribed medications? No   Is the patient taking medications as prescribed? yes   Do you have any financial concerns preventing you from receiving the healthcare you need? No   Does the patient have transportation to healthcare appointments? Yes   Discharge Plan A Home with family   Patient/Family In Agreement With Plan yes

## 2017-06-12 NOTE — ANESTHESIA POSTPROCEDURE EVALUATION
"Anesthesia Post Evaluation    Patient: Connor Marie    Procedure(s) Performed: Procedure(s) (LRB):  PROSTATECTOMY-TRANSURETHRAL; cysto bladder biopsy; left retrograde pyelogram (Left)  TUMOR-BLADDER-TRANSURETHRAL RESECTION    Final Anesthesia Type: general  Patient location during evaluation: PACU  Patient participation: Yes- Able to Participate  Level of consciousness: oriented and awake  Post-procedure vital signs: reviewed and stable  Pain management: adequate  Airway patency: patent  PONV status at discharge: No PONV  Anesthetic complications: no      Cardiovascular status: hemodynamically stable  Respiratory status: unassisted, spontaneous ventilation and room air  Hydration status: euvolemic  Follow-up not needed.        Visit Vitals  /68 (BP Location: Right arm, Patient Position: Lying, BP Method: Automatic)   Pulse (P) 84   Temp 36.4 °C (97.5 °F) (Oral)   Resp 18   Ht 5' 11" (1.803 m)   Wt 93 kg (205 lb)   SpO2 (P) 96%   BMI 28.59 kg/m²       Pain/Chelsea Score: Pain Assessment Performed: Yes (6/12/2017 11:00 AM)  Presence of Pain: denies (6/12/2017 11:00 AM)  Pain Rating Prior to Med Admin: 5 (6/12/2017 11:30 AM)  Chelsea Score: 8 (6/12/2017 11:00 AM)  Modified Chelsea Score: 18 (6/12/2017 11:00 AM)      "

## 2017-06-12 NOTE — PLAN OF CARE
Patient prefers to have Aarti Marie, spouse, present for discharge teaching. Please contact them @ [phone].

## 2017-06-13 ENCOUNTER — TELEPHONE (OUTPATIENT)
Dept: UROLOGY | Facility: CLINIC | Age: 77
End: 2017-06-13

## 2017-06-13 VITALS
HEART RATE: 72 BPM | DIASTOLIC BLOOD PRESSURE: 77 MMHG | OXYGEN SATURATION: 95 % | TEMPERATURE: 98 F | RESPIRATION RATE: 16 BRPM | WEIGHT: 205 LBS | BODY MASS INDEX: 28.7 KG/M2 | SYSTOLIC BLOOD PRESSURE: 153 MMHG | HEIGHT: 71 IN

## 2017-06-13 PROCEDURE — 25000003 PHARM REV CODE 250: Performed by: UROLOGY

## 2017-06-13 PROCEDURE — 94761 N-INVAS EAR/PLS OXIMETRY MLT: CPT

## 2017-06-13 PROCEDURE — 63600175 PHARM REV CODE 636 W HCPCS: Performed by: UROLOGY

## 2017-06-13 PROCEDURE — 25000242 PHARM REV CODE 250 ALT 637 W/ HCPCS: Performed by: UROLOGY

## 2017-06-13 PROCEDURE — 94640 AIRWAY INHALATION TREATMENT: CPT

## 2017-06-13 RX ORDER — DOCUSATE SODIUM 100 MG/1
100 CAPSULE, LIQUID FILLED ORAL 2 TIMES DAILY
Refills: 0 | COMMUNITY
Start: 2017-06-13

## 2017-06-13 RX ADMIN — SITAGLIPTIN 25 MG: 25 TABLET, FILM COATED ORAL at 10:06

## 2017-06-13 RX ADMIN — CIPROFLOXACIN 400 MG: 2 INJECTION, SOLUTION INTRAVENOUS at 10:06

## 2017-06-13 RX ADMIN — TAMSULOSIN HYDROCHLORIDE 0.4 MG: 0.4 CAPSULE ORAL at 10:06

## 2017-06-13 RX ADMIN — FLUTICASONE FUROATE AND VILANTEROL TRIFENATATE 1 PUFF: 100; 25 POWDER RESPIRATORY (INHALATION) at 09:06

## 2017-06-13 RX ADMIN — GABAPENTIN 100 MG: 100 CAPSULE ORAL at 10:06

## 2017-06-13 NOTE — TELEPHONE ENCOUNTER
----- Message from Blas Brennan MD sent at 6/13/2017 11:57 AM CDT -----  PLEASE MAKE APPT TO SEE ME Friday June 23  PT NEEDS CT CHEST WITHOUT CONTAST NEXT WEEK PRIOR TO THIS APPT  I ENTERED THE ORDER  PLEASE SCHEDULE THIS AT [facility]    THANKS    SC

## 2017-06-13 NOTE — NURSING
D/c CBI, ambulated patient approximately 100 feet per Dr. Brennan.  Dr. Brennan ordered to fill bladder 200 cc , then remove reyes.  Only able to place 120cc in bladder, patient c/o pain.  D/c reyes per dr solorzano.  Pt sitting in chair.  Gave patient a urinal and instructed him to let me know when he voids.      Amrita Reagan has been called regarding placing pt on sliding scale per Dr. Brennan.    Ting Carpio  06/13/2017  8:37 AM

## 2017-06-13 NOTE — PLAN OF CARE
Problem: Patient Care Overview  Goal: Plan of Care Review  Outcome: Ongoing (interventions implemented as appropriate)  Patient on RA sat's 95% with no distress;treatment given tolerated well.

## 2017-06-13 NOTE — PLAN OF CARE
Continuous bladder irrigation ongoing throughout shift. Patient without complaints of pain. IV antibiotics infused per order. Continuous IVF infusing. Safety measures maintained. Patient instructed to use call light for assistance. Will continue to monitor.

## 2017-06-13 NOTE — DISCHARGE INSTRUCTIONS
Thank you for choosing [facility] as your Health Care Provider. [facility] strives to provide the best healthcare available to you. In the next few days you may receive a Survey, either by mail or email,  asking you to rate our care that was provided to you during your stay.  Please return the survey to us, as your feedback is important. We aim to meet your expectations of safe, quality health care.    From your [facility] Care Team.        Discharge Instructions for Transurethral Resection of Bladder Tumor (TURBT)  You had a procedure called a resection of bladder tumor (surgery to remove a bladder tumor). During the surgery, a surgeon inserted a thin, lighted tube (cystoscope) into the bladder through the urethra (the part of your body that carries urine from the bladder to the outside of the body). The surgeon used a tool to either remove the cancer or burn it away with high-energy electricity.  Home care  · Take care of your catheter the way you were shown in the hospital. You will need to wash the tubing at least twice a day.  · Dont be alarmed by brownish or reddish blood or clots in your urine. This is a result of the procedure. However, call your doctor if the blood does not start to go away within 72 hours after you go home.  · Drink plenty of fluids during the day (enough to keep your urine very light colored). This will help keep a healthy flow of urine.  · Dont drive until the doctor says its okay.   · Dont return to work until the doctor says its okay.  · Dont do any heavy lifting for 3 weeks after the procedure.  ¨ Dont lift anything heavier than 8 pounds.  ¨ Dont lift weights.  ¨ Dont  infants or children.  · Dont mow the lawn or use a vacuum .  · Avoid constipation.  ¨ Use a laxative or stool softener as directed by your doctor.  ¨ Eat more high-fiber foods.  · Be sure to finish the antibiotics that your doctor prescribed.  · Once your catheter is  removed, expect some blood in your urine and some burning when you urinate.  Follow-up  Make a follow-up appointment as directed by your doctor.  When to call your health care provider  Call your health care provider right away if you have any of the following:  · Heavy bleeding or large blood clots in the urine  · Blood in the urine after 3 days  · Catheter falls out or stops draining  · Fever above 100.4°F or shaking chills  · Trouble urinating  · Pain or cramping in the abdomen that wont go away   Date Last Reviewed: 9/24/2014  © 1646-8602 [employer]. [address] [place] [address], [place] [address]. All rights reserved. This information is not intended as a substitute for professional medical care. Always follow your healthcare professional's instructions.

## 2017-06-13 NOTE — PLAN OF CARE
06/13/17 1208   Final Note   Assessment Type Final Discharge Note   Discharge Disposition Home   Discharge planning education complete? Yes   Hospital Follow Up  Appt(s) scheduled? Yes   Discharge plans and expectations educations in teach back method with documentation complete? Yes   Offered [email or web address] -- Bedside Delivery? n/a   Discharge/Hospital Encounter Summary to (non[facility]) PCP Yes   Referral to Outpatient Case Management complete? n/a   Referral to / orders for Home Health Complete? n/a   30 day supply of medicines given at discharge, if documented non-compliance / non-adherence? n/a   Any social issues identified prior to discharge? n/a   Did you assess the readiness or willingness of the family or caregiver to support self management of care? Yes   Right Care Referral Info   Post Acute Recommendation No Care

## 2017-06-13 NOTE — NURSING
Discharge instructions reviewed with patient and family, all questions answered, and understanding verbalized.  No IV present.  Transport requested.

## 2017-06-13 NOTE — DISCHARGE SUMMARY
[facility]  Urology  Discharge Summary      Patient Name: Connor Marie  MRN: [id number]  Admission Date: 6/12/2017  Hospital Length of Stay: 0 days  Discharge Date and Time:  06/13/2017 11:55 AM  Attending Physician: Darlyn Alvarenga MD   Discharging Provider: Darlyn Alvarenga MD  Primary Care Physician: MARCIA Flood MD    HPI:   Bladder ca    Procedure(s) (LRB):  PROSTATECTOMY-TRANSURETHRAL; cysto bladder biopsy; left retrograde pyelogram (Left)  TUMOR-BLADDER-TRANSURETHRAL RESECTION     Indwelling Lines/Drains at time of discharge:   Lines/Drains/Airways          No matching active lines, drains, or airways          Hospital Course (synopsis of major diagnoses, care, treatment, and services provided during the course of the hospital stay):   pod1  Passed voiding trial  Urine clear yellow  No complaints  Eager to go home    Consults:   Consults         Status Ordering Provider     Inpatient consult to Hospitalist  Once     Provider:  (Not yet assigned)    Acknowledged DARLYN ALVARENGA          Significant Diagnostic Studies:     Path pending  Left retrograde pyelogram normal    Pending Diagnostic Studies:     None          Final Active Diagnoses:    Diagnosis Date Noted POA    Malignant neoplasm of bladder neck [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    Specialty:  Urology  Contact information:  [address] [address]  [phone]                 Patient Instructions:     CT Chest Without Contrast   Standing Status: Future  Standing Exp. Date: 06/13/18   Order Specific Question Answer Comments   Reason for Exam: bladder ca; see recent CXR; previous pneumothorax; cannot give contrast due to CKD    May the Radiologist modify the order per protocol to meet the clinical  needs of the patient? Yes        Medications:  Reconciled Home Medications:   Current Discharge Medication List      START taking these medications    Details   docusate sodium (COLACE) 100 MG capsule Take 1 capsule (100 mg total) by mouth 2 (two) times daily.  Refills: 0         CONTINUE these medications which have NOT CHANGED    Details   azelastine-fluticasone 137-50 mcg/spray Spry nassal spray 1 spray by Nasal route.      fluticasone-vilanterol (BREO) 100-25 mcg/dose diskus inhaler Inhale 1 puff into the lungs.      gabapentin (NEURONTIN) 100 MG capsule Take 100 mg by mouth once daily.  Refills: 0      PROAIR HFA 90 mcg/actuation inhaler Refills: 0      SITagliptan (JANUVIA) 25 MG Tab Take 25 mg by mouth.      tamsulosin (FLOMAX) 0.4 mg Cp24 Refills: 0      naproxen sodium (ANAPROX) 220 MG tablet Take 220 mg by mouth 2 (two) times daily with meals.         STOP taking these medications       phenazopyridine (PYRIDIUM) 200 MG tablet Comments:   Reason for Stopping:               Time spent on the discharge of patient: 45 minutes    WILL GET CT CHEST AS OUTPATIENT TO EVAL RIGHT UPPER LOBE AREA ON CONCERN ON CXR  CANNOT GIVE CONTRAST DUE TO CKD    WILL HAVE MY OFFICE ARRANGE THIS AT [address] THEN SEE ME NEXT Friday TO REVIEW THIS AND HIS PATH    Blas Brennan MD  Urology  [facility]

## 2017-06-13 NOTE — PLAN OF CARE
Problem: Patient Care Overview  Goal: Plan of Care Review  Outcome: Ongoing (interventions implemented as appropriate)  No changes made at this time. No PRN Inhalers needed at this time. Will continue to monitor.

## 2017-06-14 ENCOUNTER — HOSPITAL ENCOUNTER (OUTPATIENT)
Dept: RADIOLOGY | Facility: HOSPITAL | Age: 77
Discharge: HOME | End: 2017-06-14
Attending: UROLOGY
Payer: MEDICARE

## 2017-06-14 DIAGNOSIS — C67.5 MALIGNANT NEOPLASM OF BLADDER NECK: ICD-10-CM

## 2017-06-14 PROCEDURE — 71250 CT THORAX DX C-: CPT | Mod: TC,PO

## 2017-06-14 PROCEDURE — 71250 CT THORAX DX C-: CPT | Mod: 26,,, | Performed by: RADIOLOGY

## 2017-06-16 ENCOUNTER — TELEPHONE (OUTPATIENT)
Dept: UROLOGY | Facility: CLINIC | Age: 77
End: 2017-06-16

## 2017-06-16 NOTE — TELEPHONE ENCOUNTER
----- Message from Blas Brennan MD sent at 6/14/2017  4:07 PM CDT -----  Called pt with results  Spoke with Dr LENY Diaz  Will refer to Dr Samuel Rodas, thoracic surgeon, to eval this  Will ask my office to arrange this ASAP        Spoke with Dr Hsu;s  Nurse she will have Dr Hsu review patient chart and they will call patient with appointment.  Left V/M message for patient stating above

## 2017-06-23 ENCOUNTER — OFFICE VISIT (OUTPATIENT)
Dept: UROLOGY | Facility: CLINIC | Age: 77
End: 2017-06-23
Attending: UROLOGY
Payer: MEDICARE

## 2017-06-23 VITALS
HEIGHT: 71 IN | DIASTOLIC BLOOD PRESSURE: 66 MMHG | SYSTOLIC BLOOD PRESSURE: 134 MMHG | WEIGHT: 205 LBS | BODY MASS INDEX: 28.7 KG/M2 | HEART RATE: 89 BPM

## 2017-06-23 DIAGNOSIS — C67.8 MALIGNANT NEOPLASM OF OVERLAPPING SITES OF BLADDER: Primary | ICD-10-CM

## 2017-06-23 DIAGNOSIS — J98.4 PULMONARY LESION: ICD-10-CM

## 2017-06-23 LAB
BILIRUB SERPL-MCNC: ABNORMAL MG/DL
BLOOD URINE, POC: 250
COLOR, POC UA: ABNORMAL
GLUCOSE UR QL STRIP: 100
KETONES UR QL STRIP: ABNORMAL
LEUKOCYTE ESTERASE URINE, POC: ABNORMAL
NITRITE, POC UA: ABNORMAL
PH, POC UA: 5
PROTEIN, POC: ABNORMAL
SPECIFIC GRAVITY, POC UA: 1.02
UROBILINOGEN, POC UA: ABNORMAL

## 2017-06-23 PROCEDURE — 1159F MED LIST DOCD IN RCRD: CPT | Mod: ,,, | Performed by: UROLOGY

## 2017-06-23 PROCEDURE — 1126F AMNT PAIN NOTED NONE PRSNT: CPT | Mod: ,,, | Performed by: UROLOGY

## 2017-06-23 PROCEDURE — 99214 OFFICE O/P EST MOD 30 MIN: CPT | Mod: S$PBB,,, | Performed by: UROLOGY

## 2017-06-23 PROCEDURE — 99213 OFFICE O/P EST LOW 20 MIN: CPT | Mod: PBBFAC | Performed by: UROLOGY

## 2017-06-23 PROCEDURE — 81002 URINALYSIS NONAUTO W/O SCOPE: CPT | Mod: PBBFAC | Performed by: UROLOGY

## 2017-06-23 PROCEDURE — 99999 PR PBB SHADOW E&M-EST. PATIENT-LVL III: CPT | Mod: PBBFAC,,, | Performed by: UROLOGY

## 2017-06-23 RX ORDER — NAPROXEN SODIUM 220 MG
220 TABLET ORAL
COMMUNITY
End: 2017-07-11

## 2017-06-23 NOTE — PATIENT INSTRUCTIONS
See Dr Duckworth, urologic oncology, [facility]  See Dr Rodas, cardiothoracic surgery  See your primary, Dr Victor  See Dr Papa Diaz

## 2017-06-23 NOTE — PROGRESS NOTES
"Subjective:      Connro Marie is a 76 y.o. male who returns today regarding his     Here to discuss his pathology results.  No complications following the procedure.    CT of the chest shows a lung nodule.  I'm referring the patient to Dr. Rea thoracic surgery and the [facility] to evaluate this  Dr. Rea performed heart surgery Mr. Marie his wife.          The following portions of the patient's history were reviewed and updated as appropriate: allergies, current medications, past family history, past medical history, past social history, past surgical history and problem list.    Review of Systems  Pertinent items are noted in HPI.  A comprehensive multipoint review of systems was negative except as otherwise stated in the HPI.     Objective:   Vitals: /66   Pulse 89   Ht 5' 11" (1.803 m)   Wt 93 kg (205 lb)   BMI 28.59 kg/m²     Physical Exam   General: alert and oriented, no acute distress  Respiratory: Symmetric expansion, non-labored breathing  Cardiovascular: no peripheral edema  Abdomen:  non distended  Skin: normal coloration and turgor, no rashes, no suspicious skin lesions noted  Neuro: no gross deficits  Psych: normal judgment and insight, normal mood/affect and non-anxious    Physical Exam    Lab Review   Urinalysis demonstrates nitrite negative.  Positive blood.  Trace leukocytes.  Grossly clear        Lab Results   Component Value Date    WBC 9.67 06/02/2017    HGB 14.8 06/02/2017    HCT 44.7 06/02/2017    MCV 91 06/02/2017     06/02/2017     Lab Results   Component Value Date    CREATININE 2.2 (H) 06/12/2017    BUN 26 (H) 06/12/2017       PreOperative Diagnosis  SPECIMEN  1) Right trigone  2) Right lateral lobe of prostate  3) Prostate left side  4) Bladder posterior wall  5) Bladder left lateral wall  FINAL PATHOLOGIC DIAGNOSIS  1. BLADDER BIOPSY: FOCAL ULCER WITH UNDERLYING INFLAMED GRANULATION TISSUE.  2. BLADDER BIOPSIES: ULCER WITH UNDERLYING INFLAMED " GRANULATION TISSUE; FOREIGN BODY  GIANT CELL; CYSTITIS GLANDULARIS.  3. BLADDER BIOPSIES: FOCAL NECROSIS, INFLAMED GRANULATION TISSUE, AND BENIGN PROSTATE  TISSUE.  4. BLADDER BIOPSIES: NONINVASIVE HIGH-GRADE PAPILLARY UROTHELIAL CARCINOMA.  5. BLADDER BIOPSIES: NONINVASIVE LOW-GRADE PAPILLARY UROTHELIAL CARCINOMA.  Note: Ribbon sections of the specimen were examined microscopically at deeper levels.  [facility]  Diagnosed by: Karan Cottrell M.D.  (Electronically Signed: 2017-06-15 13:57:12)    Imaging  Retrograde pyelograms no evidence of upper tract disease.  Solitary left kidney and ureter    Assessment and Plan:   Malignant neoplasm of overlapping sites of bladder  Recurrent high grade TA status post multiple courses of BCG  We discussed the management options for BCG refractory non-muscle invasive bladder cancer  We discussed valrubicin and interferon  We discussed the role of cystectomy in the setting; He is not willing to have a cystectomy  I will have him meet with Dr. Ernesto Duckworth to review the options further and discuss with him a trial we for BCG refractory NMIBC      Pulmonary lesion  My office made an appointment for him to see Dr. Rea      He will follow up with his PCP, Dr Victor, and Dr Papa Diaz, urology, [facility]    Return to see me prn

## 2017-07-11 ENCOUNTER — OFFICE VISIT (OUTPATIENT)
Dept: VASCULAR SURGERY | Facility: CLINIC | Age: 77
End: 2017-07-11
Payer: MEDICARE

## 2017-07-11 VITALS
HEART RATE: 78 BPM | WEIGHT: 204.63 LBS | BODY MASS INDEX: 28.65 KG/M2 | SYSTOLIC BLOOD PRESSURE: 148 MMHG | DIASTOLIC BLOOD PRESSURE: 77 MMHG | HEIGHT: 71 IN

## 2017-07-11 DIAGNOSIS — R91.8 MASS OF UPPER LOBE OF RIGHT LUNG: ICD-10-CM

## 2017-07-11 DIAGNOSIS — J93.83 SPONTANEOUS PNEUMOTHORAX: ICD-10-CM

## 2017-07-11 DIAGNOSIS — J43.1 PANLOBULAR EMPHYSEMA: ICD-10-CM

## 2017-07-11 DIAGNOSIS — C67.5 MALIGNANT NEOPLASM OF BLADDER NECK: Primary | ICD-10-CM

## 2017-07-11 PROCEDURE — 1159F MED LIST DOCD IN RCRD: CPT | Mod: ,,, | Performed by: THORACIC SURGERY (CARDIOTHORACIC VASCULAR SURGERY)

## 2017-07-11 PROCEDURE — 99205 OFFICE O/P NEW HI 60 MIN: CPT | Mod: S$PBB,,, | Performed by: THORACIC SURGERY (CARDIOTHORACIC VASCULAR SURGERY)

## 2017-07-11 PROCEDURE — 1125F AMNT PAIN NOTED PAIN PRSNT: CPT | Mod: ,,, | Performed by: THORACIC SURGERY (CARDIOTHORACIC VASCULAR SURGERY)

## 2017-07-11 PROCEDURE — 99999 PR PBB SHADOW E&M-EST. PATIENT-LVL III: CPT | Mod: PBBFAC,,, | Performed by: THORACIC SURGERY (CARDIOTHORACIC VASCULAR SURGERY)

## 2017-07-11 PROCEDURE — 99213 OFFICE O/P EST LOW 20 MIN: CPT | Mod: PBBFAC,PO | Performed by: THORACIC SURGERY (CARDIOTHORACIC VASCULAR SURGERY)

## 2017-07-11 NOTE — LETTER
July 13, 2017      Blas Brennan MD  [address] [address]           [place]  [address] [facility] [address]  Phone: [phone]          Patient: Connor Marie   MR Number: [id number]   YOB: 1940   Date of Visit: 7/11/2017       Dear Dr. Blas Brennan:    Thank you for referring Connor Marei to me for evaluation. Attached you will find relevant portions of my assessment and plan of care.    If you have questions, please do not hesitate to call me. I look forward to following Connor Marie along with you.    Sincerely,    Samuel Rodas MD    Enclosure  CC:  No Recipients    If you would like to receive this communication electronically, please contact [email or web address] or [phone] to request more information on [email or web address] Link access.    For providers and/or their staff who would like to refer a patient to [facility], please contact us through our one-stop-shop provider referral line, [facility], at [phone].    If you feel you have received this communication in error or would no longer like to receive these types of communications, please e-mail [email or web address]

## 2017-07-13 PROBLEM — J93.83 SPONTANEOUS PNEUMOTHORAX: Status: ACTIVE | Noted: 2017-07-13

## 2017-07-13 PROBLEM — R91.8 MASS OF UPPER LOBE OF RIGHT LUNG: Status: ACTIVE | Noted: 2017-07-13

## 2017-07-13 PROBLEM — J43.1 PANLOBULAR EMPHYSEMA: Status: ACTIVE | Noted: 2017-07-13

## 2017-07-13 NOTE — PROGRESS NOTES
CONSULTATION NOTE    CHIEF COMPLAINT:  Right lung abnormality.    REASON FOR CONSULTATION:  Evaluation of right lung abnormality.    HISTORY OF PRESENT ILLNESS:  The patient is a 76-year-old white male who is   being evaluated by Dr. Brennan for recurrent urothelial malignancy of the   bladder, and bladder neck.  He was found on imaging studies of the chest to have   abnormalities of the upper lobe of the right lung.  The patient denies fevers,   chills, night sweats or weight changes.  He does have a history of several   spontaneous pneumothoraces involving the right lung, more so than the left lung   in the remote past.  These were treated with observation.  He also did have a   pneumonia of the right upper lobe of his lung in approximately 2003.  He   presents today with CT scans from the remote past for further review.    PAST MEDICAL HISTORY:  Spontaneous pneumothorax bilaterally, type 2 diabetes,   chronic obstructive pulmonary disease, bladder cancer.    PAST SURGICAL HISTORY:  Right nephrectomy, TURBT, laparoscopic cholecystectomy,   back surgery.    MEDICATIONS:  Include Flomax, Januvia, ProAir, Breo and Colace.    ALLERGIES:  Penicillin.    SOCIAL HISTORY:  The patient quit cigarette use in 2014.    FAMILY HISTORY:  Positive for stroke and diabetes in the father, intestinal   cancer in the mother, leukemia and prostate cancer in a brother, brain tumor and   scleroderma in sisters.    REVIEW OF SYSTEMS:  GENERAL:  No fevers, chills, night sweats or weight changes.  HEENT:  He has cataract in the left eye with some vision changes secondary to   this.  He has no hoarseness.  NECK:  No complaints.  RESPIRATORY:  Positive shortness of breath with significant activity, occasional   pleuritic-type chest pains.  CARDIAC:  No angina, orthopnea or PND.  GASTROINTESTINAL:  No constipation or melena.  GENITOURINARY:  He has had ongoing therapy with BCG and multiple endoscopic   procedures for his bladder neck and  bladder tumors.  SKIN:  No rash.  MUSCULOSKELETAL:  Chronic back pains.  VASCULAR:  No claudication.  NEUROLOGIC:  No lateralizing complaints.    PHYSICAL EXAMINATION:  VITAL SIGNS:  Blood pressure 148/77, heart rate 78, weight 92.8 kg, height 5   feet 7 inches.  GENERAL:  Well-nourished, well-developed man, in no obvious distress.  HEENT:  Normocephalic, atraumatic.  There is good facial symmetry.  NECK:  Trachea is midline.  There is no jugular venous distention.  CHEST:  No chest wall abnormalities.  LUNGS:  Clear bilaterally.  HEART:  Regular rate and rhythm.  No murmur or rub.  ABDOMEN:  Multiple small surgical scars are present.  Bowel sounds are normal.    There is no organomegaly.  EXTREMITIES:  No cyanosis, clubbing or edema.  VASCULAR:  2+ radial and carotid pulses bilaterally.  LYMPHATIC:  No palpable supraclavicular lymphadenopathy.  SKIN:  No rash.  NEUROLOGIC:  Alert and oriented x4 with no focal deficits.    STUDIES:  CT scan of the chest from [facility] on 06/14/2017 is reviewed by   computer.  The patient brings CAT scan images from 2003, which I have reviewed   on the x-ray box.  In comparing the older films to the more current ones, it was   obvious that he had processes involving the upper lobes of both lungs dating   back to 2003.  The abnormalities in the left upper lobe appears to be scarring   and that is unchanged.  The right upper lobe did have significant emphysema that   has progressed throughout the upper lobe to the spine.  He was affected   significantly by pneumonia in the area where there is current thickening that   has not been ruled out for Radiology as potential malignancy.  There is no   mediastinal lymphadenopathy.    IMPRESSION:  1.  Right upper lobe lung abnormality suggestive by my review of past imaging as   probable scar rather than malignancy.  The patient does have another set of CT   scans performed at [facility] approximately two years ago,   which are  not available for my review.  2.  Panlobular emphysema.  3.  History of previous spontaneous pneumothorax.  4.  History of urothelial cancer.  5.  COPD.    RECOMMENDATIONS:  I would like to have radiologist review not only his current   CT scan, but also compare this to the images that the patient has provided for   me as well as possibly those at [facility] to determine whether the likelihood   of this right upper lobe abnormality being cancer is significant or not.  If   there is still significant possibility, then a PET scan will be performed.      ANTONI/MARCUS  dd: 07/13/2017 10:28:53 (CDT)  td: 07/13/2017 15:03:41 (CDT)  Doc ID   #[id number]  Job ID #[id number]    CC: BENTLEY Brennan M.D.

## 2017-07-17 ENCOUNTER — OFFICE VISIT (OUTPATIENT)
Dept: UROLOGY | Facility: CLINIC | Age: 77
End: 2017-07-17
Payer: MEDICARE

## 2017-07-17 VITALS
HEIGHT: 71 IN | DIASTOLIC BLOOD PRESSURE: 63 MMHG | WEIGHT: 206.13 LBS | SYSTOLIC BLOOD PRESSURE: 147 MMHG | BODY MASS INDEX: 28.86 KG/M2 | HEART RATE: 82 BPM

## 2017-07-17 DIAGNOSIS — C67.8 MALIGNANT NEOPLASM OF OVERLAPPING SITES OF BLADDER: Primary | ICD-10-CM

## 2017-07-17 DIAGNOSIS — N18.30 CKD (CHRONIC KIDNEY DISEASE), STAGE III: ICD-10-CM

## 2017-07-17 PROCEDURE — 1159F MED LIST DOCD IN RCRD: CPT | Mod: ,,, | Performed by: UROLOGY

## 2017-07-17 PROCEDURE — 99999 PR PBB SHADOW E&M-EST. PATIENT-LVL III: CPT | Mod: PBBFAC,,, | Performed by: UROLOGY

## 2017-07-17 PROCEDURE — 99215 OFFICE O/P EST HI 40 MIN: CPT | Mod: S$PBB,,, | Performed by: UROLOGY

## 2017-07-17 PROCEDURE — 1125F AMNT PAIN NOTED PAIN PRSNT: CPT | Mod: ,,, | Performed by: UROLOGY

## 2017-07-17 PROCEDURE — 99213 OFFICE O/P EST LOW 20 MIN: CPT | Mod: PBBFAC | Performed by: UROLOGY

## 2017-07-17 NOTE — PATIENT INSTRUCTIONS
Understanding Bladder Cancer    The urinary system includes the kidneys, ureters, bladder, and urethra. It makes, stores, and gets rid of liquid waste called urine. The two kidneys filter blood to collect waste and make urine. The ureters are small tubes that carry urine from each kidney to the bladder. The bladder is where urine is stored before it leaves the body. The urethra is the tube urine goes through to leave the body.   Bladder cancer means that certain cells in the bladder have changed in ways that aren't normal.  When bladder cancer forms  Cancer is a disease that causes cells to change and multiply out of control. The multiplying cells may form a lump of tissue (tumor). With time, the cancer cells destroy healthy tissue. They may spread to other parts of the body. Why some cells become cancerous is not always clear. But bladder cancer is strongly linked to cigarette smoking. The longer a person smokes and the more a person smokes, the greater that persons chances of developing bladder cancer.  Types of cancer that may form  Bladder cancer may grow in different ways:  · Papillary tumors stick out from the bladder lining on a stalk. They tend to grow into the bladder cavity, away from the bladder wall, instead of deeper into the layers of the bladder wall.  · Flat tumors do not stick out from the bladder lining. These tumors are much more likely than papillary tumors to grow deeper into the layers of the bladder wall.  · Carcinoma in situ (CIS) is a cancerous patch of cells that is only in the inner layer of the bladder lining and has not spread to deeper tissue. The patch may look almost normal or may look inflamed.    Each type of tumor can be present in one or more areas of the bladder, and more than one type can be present at the same time.  Date Last Reviewed: 2/17/2016  © 7518-2377 [employer]. [address] [place] [address], [place] [address]. All rights reserved. This information is not  intended as a substitute for professional medical care. Always follow your healthcare professional's instructions.

## 2017-07-17 NOTE — PROGRESS NOTES
"Subjective:       Patient ID: Connor Marie is a 76 y.o. male.    Chief Complaint: No chief complaint on file.      HPI: Connor Marie is a 76 y.o. White male who presents today for evaluation and management of BCG-refractory non-muscle invasive bladder cancer.    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    The patient has a significant history of smoking, having quit in 2014.    The patient presents today to discuss management options for his recurrent, BCG refractory bladder cancer. He is steadfast in his refusal to undergo a radical cystectomy at this point.    Review of patient's allergies indicates:   Allergen Reactions    Penicillins      [employer] "H[employer] A MILD REACTION"  "Unsure if I am still allergic but I do not want to take the chance."       Current Outpatient Prescriptions   Medication Sig Dispense Refill    docusate sodium (COLACE) 100 MG capsule Take 1 capsule (100 mg total) by mouth 2 (two) times daily.  0    fluticasone-vilanterol (BREO) 100-25 mcg/dose diskus inhaler Inhale 1 puff into the lungs.      PROAIR HFA 90 mcg/actuation inhaler   0    SITagliptan (JANUVIA) 25 MG Tab Take 25 mg by mouth.      tamsulosin (FLOMAX) 0.4 mg Cp24   0     No current facility-administered medications for this visit.        Past Medical History:   Diagnosis Date    Bladder cancer     COPD (chronic obstructive pulmonary disease)     Diabetes " mellitus     Kidney mass     Mass of upper lobe of right lung 7/13/2017    Panlobular emphysema 7/13/2017    SOB (shortness of breath)     Spontaneous pneumothorax 7/13/2017       Past Surgical History:   Procedure Laterality Date    BACK SURGERY      CHOLECYSTECTOMY      NEPHRECTOMY      rt nephrectomy      TURBT         Family History   Problem Relation Age of Onset    Colon cancer Mother     Stroke Father     Diabetes Father     Brain cancer Sister     Scleroderma Sister     Leukemia Brother     Prostate cancer Brother        Review of Systems    Review of Systems   Constitutional: Negative for fever, chills, activity change, appetite change and unexpected weight change.   HENT: Negative for congestion, nosebleeds, sneezing, sore throat and trouble swallowing.    Eyes: Negative for pain, discharge, redness and visual disturbance.   Respiratory: Negative for cough, choking, chest tightness and shortness of breath.    Cardiovascular: Negative for chest pain, palpitations and leg swelling.   Gastrointestinal: Negative for nausea, vomiting, abdominal pain, diarrhea, blood in stool, abdominal distention and anal bleeding.  Genitourinary: As documented per HPI   Endocrine: Negative for cold intolerance, heat intolerance, polydipsia, polyphagia and polyuria.   Musculoskeletal: Negative for myalgias, gait problem, neck pain and neck stiffness.   Skin: Negative for color change, pallor, rash and wound.   Allergic/Immunologic: Negative for immunocompromised state.   Neurological: Negative for seizures, facial asymmetry, speech difficulty, weakness and light-headedness.   Hematological: Negative for adenopathy. Does not bruise/bleed easily.   Psychiatric/Behavioral: Negative for hallucinations, behavioral problems, self-injury and agitation. The patient is not hyperactive.    All other systems were reviewed and were negative.      Objective:     Vitals:    07/17/17 0847   BP: (!) 147/63   Pulse: 82      Physical Exam   Vitals reviewed.  Constitutional: He is oriented to person, place, and time. He appears well-developed and well-nourished. No distress.   HENT:   Head: Normocephalic and atraumatic.   Right Ear: External ear normal.   Left Ear: External ear normal.   Nose: Nose normal.   Eyes: EOM are normal. Pupils are equal, round, and reactive to light. Right eye exhibits no discharge. Left eye exhibits no discharge.   Neck: Normal range of motion. Neck supple. No tracheal deviation present. No thyroid enlargement or tenderness.  Cardiovascular: Regular rhythm and intact distal pulses. No signs of peripheral edema.   Pulmonary/Chest: Effort normal and breath sounds normal. No stridor. No respiratory distress. He has no wheezes.   Abdominal: Soft. Bowel sounds are normal. He exhibits no distension. There is no tenderness. Hernia confirmed negative in the right inguinal area and confirmed negative in the left inguinal area. No hepatic or splenic enlargement. Healed nephroureterectomy scars.  Genitourinary: Penis normal. Right testis shows no mass, no swelling and no tenderness. Right testis is descended. Left testis shows no mass, no swelling and no tenderness. Left testis is descended. Circumcised. No phimosis or hypospadias.   SHANNON: Deferred.  Musculoskeletal: Normal range of motion. He exhibits no edema.   Neurological: He is alert and oriented to person, place, and time. He exhibits normal muscle tone. Coordination normal.   Skin: Skin is warm. No rash noted.   Lymphatic: No palpable lymph nodes.  Psychiatric: He has a normal mood and affect. His behavior is normal. Judgment and thought content normal.     No results found for: PSA  Lab Results   Component Value Date    CREATININE 2.2 (H) 06/12/2017     Lab Results   Component Value Date    EGFRNONAA 28 (A) 06/12/2017     Lab Results   Component Value Date    ESTGFRAFRICA 32 (A) 06/12/2017     I personally reviewed all the patient's films.    Assessment:        1. Malignant neoplasm of overlapping sites of bladder    2. CKD (chronic kidney disease), stage III        Plan:     Diagnoses and all orders for this visit:    Malignant neoplasm of overlapping sites of bladder    CKD (chronic kidney disease), stage III    The patient has recurrent and BCG-refractory non-muscle invasive bladder cancer.    I had a lengthy discussion with the patient regarding implications of a diagnosis of urothelial carcinoma of the bladder, i.e, bladder cancer.    I explained that individuals with non-muscle invasive bladder cancer account for 75-80% of those with bladder cancer.  Nearly all of such patients exhibit urothelial carcinoma histology (previously known as TCC).  Management of patients with this condition focuses on prevention of disease progression, since effectiveness of salvage therapies for patients with systemic urothelial carcinoma is currently extremely limited.  Indeed, we discussed that approximately 15-25% of all-comers with non-muscle invasive disease will progress to muscle-invasive pathology.  I explained that muscle-invasion is considered the gateway to metastatic progression; however, a small but real percentage of patients can progress to systemic disease without ever demonstrating evidence of muscle-invasive pathology.       Furthermore we discussed that the majority of patients with urothelial carcinoma will exhibit tumor recurrence (up to 75%).  All surfaces of the patients  tract (collecting system of kidney, ureters, bladder, portion of urethera) are susceptible to recurrences and must be monitored closely for the patients life-time.      We discussed that tumor grade and tumor stage in the setting of a properly-performed transurethral resection are the variables that largely guide prognostication and treatment.  Some of the other risk factors include presence of CIS, multifocality, tumor size >3 cm, lymphovascular invasion, and rapid tumor recurrence.   I  emphasized that endoscopic management along with upper tract surveillance are the mainstay treatments of non-muscle invasive bladder cancer.  In addition, as necessary, intravesical chemo/immunotherapy is utilized.  For instance, following endoscopic resection, Level 1 evidence exists to show that a single, prophylactic instillation of intravesical Mitomycin C results in a significant reduction (up to 39% in a recent metaanalysis of 7 randomized trials) of tumor recurrence.  For patients with high risk disease, induction and at times maintenance intravesical therapy with Baccillus Calmette-Kym (BCG) is indicated.  Furthermore, we briefly discussed the concept of early cystectomy for patients with high risk non-muscle invasive bladder cancer.    The patient is adamant at this point in not wanting a radical cystectomy, which would be likely curative. Thus, at this point, his options would be to consider repeat induction BCG, salvage intravesical therapy, or the FKD trial. We reviewed all these options, and I answered all his and his wife's questions. We did discuss the FKD trial at length for which I think the patient may be a great candidate, pending approval and screening.    At the conclusion of our discussion, I told the patient that I will have to review all his records and discuss his scenario with the medical monitor for the trial to see if the patient would qualify. After I have all this information, I will let the patient know.     I encouraged him or any of his family members to call or email me with questions and/or concerns.    I spent 60 minutes with the patient of which more than half was spent in coordinating the patient's care as well as in direct consultation with the patient in regards to our treatment and plan.

## 2017-08-08 ENCOUNTER — TELEPHONE (OUTPATIENT)
Dept: VASCULAR SURGERY | Facility: CLINIC | Age: 77
End: 2017-08-08

## 2017-08-08 NOTE — TELEPHONE ENCOUNTER
----- Message from Theresa Benedict sent at 8/8/2017  1:31 PM CDT -----  Contact: Pt  Pt is checking to see if Dr. Rodas is finished viewing pt's radiology films. Pt is supposed to come in and  the films. Pls call pt back at [phone].

## 2017-08-09 ENCOUNTER — TELEPHONE (OUTPATIENT)
Dept: VASCULAR SURGERY | Facility: CLINIC | Age: 77
End: 2017-08-09

## 2017-08-09 NOTE — TELEPHONE ENCOUNTER
Findings discussed from review of old CT scans as well as review of current CT scans.  Radiology and I are in concurrence that the findings in the upper lobe of the right long or most likely chronic and related to scar tissue.  I do not believe that further follow-up scans are necessary.  I have discussed this with Mr. Marei. He appears to understand and agrees.

## 2017-08-09 NOTE — TELEPHONE ENCOUNTER
Dr Vallejo reveiwed outside CT Scans and patient wants to discuss the findings with Dr Rea. OV 8/22

## 2018-04-25 ENCOUNTER — OFFICE VISIT (OUTPATIENT)
Dept: UROLOGY | Facility: CLINIC | Age: 78
End: 2018-04-25
Payer: MEDICARE

## 2018-04-25 VITALS
BODY MASS INDEX: 28.7 KG/M2 | SYSTOLIC BLOOD PRESSURE: 137 MMHG | WEIGHT: 205 LBS | HEART RATE: 78 BPM | HEIGHT: 71 IN | DIASTOLIC BLOOD PRESSURE: 63 MMHG

## 2018-04-25 DIAGNOSIS — C67.8 MALIGNANT NEOPLASM OF OVERLAPPING SITES OF BLADDER: Primary | ICD-10-CM

## 2018-04-25 DIAGNOSIS — N18.30 CKD (CHRONIC KIDNEY DISEASE), STAGE III: ICD-10-CM

## 2018-04-25 PROCEDURE — 99215 OFFICE O/P EST HI 40 MIN: CPT | Mod: S$PBB,,, | Performed by: UROLOGY

## 2018-04-25 PROCEDURE — 99213 OFFICE O/P EST LOW 20 MIN: CPT | Mod: PBBFAC | Performed by: UROLOGY

## 2018-04-25 PROCEDURE — 99999 PR PBB SHADOW E&M-EST. PATIENT-LVL III: CPT | Mod: PBBFAC,,, | Performed by: UROLOGY

## 2018-04-25 NOTE — PROGRESS NOTES
"Subjective:       Patient ID: Connor Marie is a 77 y.o. male.    Chief Complaint: No chief complaint on file.      HPI: Connor Marie is a 77 y.o. White male who returns today in follow-up for management of BCG-refractory non-muscle invasive bladder cancer.    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    The patient has a significant history of smoking, having quit in 2014.    The patient returns today to discuss again options for his recurrent, BCG refractory bladder cancer.     He is still reluctant to undergo a radical cystectomy.    The patient also has significant urinary symptoms, especially after BCG treatments:  nocturia x 0-5, hesitancy, intermittency, straining. He is on flomax, which helps some.      Review of patient's allergies indicates:   Allergen Reactions    Penicillins      [employer] "H[employer] A MILD REACTION"  "Unsure if I am still allergic but I do not want to take the chance."       Current Outpatient Prescriptions   Medication Sig Dispense Refill    docusate sodium (COLACE) 100 MG capsule Take 1 capsule (100 mg total) by mouth 2 (two) times daily.  0    fluticasone-vilanterol (BREO) 100-25 mcg/dose diskus inhaler Inhale 1 puff into the lungs.      PROAIR HFA 90 mcg/actuation inhaler   0    SITagliptan (JANUVIA) 25 MG Tab Take 25 mg by mouth.   "    tamsulosin (FLOMAX) 0.4 mg Cp24   0     No current facility-administered medications for this visit.        Past Medical History:   Diagnosis Date    Bladder cancer     COPD (chronic obstructive pulmonary disease)     Diabetes mellitus     Kidney mass     Mass of upper lobe of right lung 7/13/2017    Panlobular emphysema 7/13/2017    SOB (shortness of breath)     Spontaneous pneumothorax 7/13/2017       Past Surgical History:   Procedure Laterality Date    BACK SURGERY      CHOLECYSTECTOMY      NEPHRECTOMY      rt nephrectomy      TURBT         Family History   Problem Relation Age of Onset    Colon cancer Mother     Stroke Father     Diabetes Father     Brain cancer Sister     Scleroderma Sister     Leukemia Brother     Prostate cancer Brother        Review of Systems    Review of Systems   Constitutional: Negative for fever, chills, activity change, appetite change and unexpected weight change.   HENT: Negative for congestion, nosebleeds, sneezing, sore throat and trouble swallowing.    Eyes: Negative for pain, discharge, redness and visual disturbance.   Respiratory: Negative for cough, choking, chest tightness and shortness of breath.    Cardiovascular: Negative for chest pain, palpitations and leg swelling.   Gastrointestinal: Negative for nausea, vomiting, abdominal pain, diarrhea, blood in stool, abdominal distention and anal bleeding.  Genitourinary: As documented per HPI   Endocrine: Negative for cold intolerance, heat intolerance, polydipsia, polyphagia and polyuria.   Musculoskeletal: Negative for myalgias, gait problem, neck pain and neck stiffness.   Skin: Negative for color change, pallor, rash and wound.   Allergic/Immunologic: Negative for immunocompromised state.   Neurological: Negative for seizures, facial asymmetry, speech difficulty, weakness and light-headedness.   Hematological: Negative for adenopathy. Does not bruise/bleed easily.   Psychiatric/Behavioral: Negative  for hallucinations, behavioral problems, self-injury and agitation. The patient is not hyperactive.    All other systems were reviewed and were negative.      Objective:     Vitals:    04/25/18 1004   BP: 137/63   Pulse: 78     Physical Exam   Vitals reviewed.  Constitutional: He is oriented to person, place, and time. He appears well-developed and well-nourished. No distress.   HENT:   Head: Normocephalic and atraumatic.   Right Ear: External ear normal.   Left Ear: External ear normal.   Nose: Nose normal.   Eyes: EOM are normal. Pupils are equal, round, and reactive to light. Right eye exhibits no discharge. Left eye exhibits no discharge.   Neck: Normal range of motion. Neck supple. No tracheal deviation present. No thyroid enlargement or tenderness.  Cardiovascular: Regular rhythm and intact distal pulses. No signs of peripheral edema.   Pulmonary/Chest: Effort normal and breath sounds normal. No stridor. No respiratory distress. He has no wheezes.   Abdominal: Soft. Bowel sounds are normal. He exhibits no distension. There is no tenderness. Hernia confirmed negative in the right inguinal area and confirmed negative in the left inguinal area. No hepatic or splenic enlargement. Healed nephroureterectomy scars.  Genitourinary: Penis normal. Right testis shows no mass, no swelling and no tenderness. Right testis is descended. Left testis shows no mass, no swelling and no tenderness. Left testis is descended. Circumcised. No phimosis or hypospadias.   SHANNON: Deferred.  Musculoskeletal: Normal range of motion. He exhibits no edema.   Neurological: He is alert and oriented to person, place, and time. He exhibits normal muscle tone. Coordination normal.   Skin: Skin is warm. No rash noted.   Lymphatic: No palpable lymph nodes.  Psychiatric: He has a normal mood and affect. His behavior is normal. Judgment and thought content normal.     No results found for: PSA  Lab Results   Component Value Date    CREATININE 2.2 (H)  06/12/2017     Lab Results   Component Value Date    EGFRNONAA 28 (A) 06/12/2017     Lab Results   Component Value Date    ESTGFRAFRICA 32 (A) 06/12/2017     I personally reviewed all the patient's films.    Assessment:       1. Malignant neoplasm of overlapping sites of bladder    2. CKD (chronic kidney disease), stage III        Plan:     Diagnoses and all orders for this visit:    Malignant neoplasm of overlapping sites of bladder    CKD (chronic kidney disease), stage III    The patient has recurrent and BCG-refractory non-muscle invasive bladder cancer despite repeat induction BCG.    I had a lengthy discussion with the patient regarding implications of a diagnosis of urothelial carcinoma of the bladder, i.e, bladder cancer.    I explained that individuals with non-muscle invasive bladder cancer account for 75-80% of those with bladder cancer.  Nearly all of such patients exhibit urothelial carcinoma histology (previously known as TCC).  Management of patients with this condition focuses on prevention of disease progression, since effectiveness of salvage therapies for patients with systemic urothelial carcinoma is currently extremely limited.  Indeed, we discussed that approximately 15-25% of all-comers with non-muscle invasive disease will progress to muscle-invasive pathology.  I explained that muscle-invasion is considered the gateway to metastatic progression; however, a small but real percentage of patients can progress to systemic disease without ever demonstrating evidence of muscle-invasive pathology.       Furthermore we discussed that the majority of patients with urothelial carcinoma will exhibit tumor recurrence (up to 75%).  All surfaces of the patients  tract (collecting system of kidney, ureters, bladder, portion of urethera) are susceptible to recurrences and must be monitored closely for the patients life-time.      We discussed that tumor grade and tumor stage in the setting of a  properly-performed transurethral resection are the variables that largely guide prognostication and treatment.  Some of the other risk factors include presence of CIS, multifocality, tumor size >3 cm, lymphovascular invasion, and rapid tumor recurrence.   I emphasized that endoscopic management along with upper tract surveillance are the mainstay treatments of non-muscle invasive bladder cancer.  In addition, as necessary, intravesical chemo/immunotherapy is utilized.  For instance, following endoscopic resection, Level 1 evidence exists to show that a single, prophylactic instillation of intravesical Mitomycin C results in a significant reduction (up to 39% in a recent metaanalysis of 7 randomized trials) of tumor recurrence.  For patients with high risk disease, induction and at times maintenance intravesical therapy with Baccillus Calmette-Kym (BCG) is indicated.  Furthermore, we briefly discussed the concept of early cystectomy for patients with high risk non-muscle invasive bladder cancer.    At this point, the patient's options are radical cystectomy, salvage gemcitabine, or continued BCG. We reviewed all these options, and I answered all his and his wife's questions. Radical cystectomy, from a cancer standpoint, would be the best option, however it has its drawbacks. I think continued BCG will ultimately result in metastatic disease. Salvage gemcitabine may be a better options at this point.    I answered all their questions, and the patient stated that he would let me know how he wishes to proceed.    I encouraged him or any of his family members to call or email me with questions and/or concerns.    I spent 45 minutes with the patient of which more than half was spent in coordinating the patient's care as well as in direct consultation with the patient in regards to our treatment and plan.

## 2018-04-25 NOTE — PATIENT INSTRUCTIONS
Understanding Bladder Cancer    The urinary system includes the kidneys, ureters, bladder, and urethra. It makes, stores, and gets rid of liquid waste called urine. The two kidneys filter blood to collect waste and make urine. The ureters are small tubes that carry urine from each kidney to the bladder. The bladder is where urine is stored before it leaves the body. The urethra is the tube urine goes through to leave the body.   Bladder cancer means that certain cells in the bladder have changed in ways that aren't normal.  When bladder cancer forms  Cancer is a disease that causes cells to change and multiply out of control. The multiplying cells may form a lump of tissue (tumor). With time, the cancer cells destroy healthy tissue. They may spread to other parts of the body. Why some cells become cancerous is not always clear. But bladder cancer is strongly linked to cigarette smoking. The longer a person smokes and the more a person smokes, the greater that persons chances of developing bladder cancer.  Types of cancer that may form  Bladder cancer may grow in different ways:  · Papillary tumors stick out from the bladder lining on a stalk. They tend to grow into the bladder cavity, away from the bladder wall, instead of deeper into the layers of the bladder wall.  · Flat tumors do not stick out from the bladder lining. These tumors are much more likely than papillary tumors to grow deeper into the layers of the bladder wall.  · Carcinoma in situ (CIS) is a cancerous patch of cells that is only in the inner layer of the bladder lining and has not spread to deeper tissue. The patch may look almost normal or may look inflamed.    Each type of tumor can be present in one or more areas of the bladder, and more than one type can be present at the same time.  Date Last Reviewed: 2/17/2016  © 4797-9957 [employer]. [address] [place] [address], [place] [address]. All rights reserved. This information is not  intended as a substitute for professional medical care. Always follow your healthcare professional's instructions.

## 2018-07-09 ENCOUNTER — OFFICE VISIT (OUTPATIENT)
Dept: UROLOGY | Facility: CLINIC | Age: 78
End: 2018-07-09
Payer: MEDICARE

## 2018-07-09 ENCOUNTER — LAB VISIT (OUTPATIENT)
Dept: LAB | Facility: HOSPITAL | Age: 78
End: 2018-07-09
Attending: UROLOGY
Payer: MEDICARE

## 2018-07-09 ENCOUNTER — TELEPHONE (OUTPATIENT)
Dept: UROLOGY | Facility: CLINIC | Age: 78
End: 2018-07-09

## 2018-07-09 VITALS
HEART RATE: 69 BPM | DIASTOLIC BLOOD PRESSURE: 73 MMHG | SYSTOLIC BLOOD PRESSURE: 156 MMHG | RESPIRATION RATE: 16 BRPM | BODY MASS INDEX: 28.7 KG/M2 | HEIGHT: 71 IN | WEIGHT: 205 LBS

## 2018-07-09 DIAGNOSIS — J98.4 PULMONARY LESION: ICD-10-CM

## 2018-07-09 DIAGNOSIS — C67.8 MALIGNANT NEOPLASM OF OVERLAPPING SITES OF BLADDER: ICD-10-CM

## 2018-07-09 DIAGNOSIS — D49.4 NEOPLASM OF BLADDER: ICD-10-CM

## 2018-07-09 DIAGNOSIS — N18.30 CKD (CHRONIC KIDNEY DISEASE), STAGE III: ICD-10-CM

## 2018-07-09 DIAGNOSIS — C67.8 MALIGNANT NEOPLASM OF OVERLAPPING SITES OF BLADDER: Primary | ICD-10-CM

## 2018-07-09 DIAGNOSIS — C68.0 URETHRAL CANCER: ICD-10-CM

## 2018-07-09 LAB
ALBUMIN SERPL BCP-MCNC: 4.2 G/DL
ALBUMIN SERPL BCP-MCNC: 4.2 G/DL
ALP SERPL-CCNC: 76 U/L
ALT SERPL W/O P-5'-P-CCNC: 35 U/L
ANION GAP SERPL CALC-SCNC: 10 MMOL/L
AST SERPL-CCNC: 25 U/L
BASOPHILS # BLD AUTO: 0.1 K/UL
BASOPHILS NFR BLD: 0.9 %
BILIRUB SERPL-MCNC: 0.9 MG/DL
BUN SERPL-MCNC: 29 MG/DL
CALCIUM SERPL-MCNC: 11 MG/DL
CHLORIDE SERPL-SCNC: 109 MMOL/L
CO2 SERPL-SCNC: 24 MMOL/L
CREAT SERPL-MCNC: 2.4 MG/DL
DIFFERENTIAL METHOD: ABNORMAL
EOSINOPHIL # BLD AUTO: 0.5 K/UL
EOSINOPHIL NFR BLD: 4.3 %
ERYTHROCYTE [DISTWIDTH] IN BLOOD BY AUTOMATED COUNT: 12.8 %
EST. GFR  (AFRICAN AMERICAN): 29 ML/MIN/1.73 M^2
EST. GFR  (NON AFRICAN AMERICAN): 25.1 ML/MIN/1.73 M^2
GLUCOSE SERPL-MCNC: 91 MG/DL
HCT VFR BLD AUTO: 49.3 %
HGB BLD-MCNC: 16.2 G/DL
IMM GRANULOCYTES # BLD AUTO: 0.06 K/UL
IMM GRANULOCYTES NFR BLD AUTO: 0.5 %
LYMPHOCYTES # BLD AUTO: 3.5 K/UL
LYMPHOCYTES NFR BLD: 30 %
MCH RBC QN AUTO: 30.2 PG
MCHC RBC AUTO-ENTMCNC: 32.9 G/DL
MCV RBC AUTO: 92 FL
MONOCYTES # BLD AUTO: 1 K/UL
MONOCYTES NFR BLD: 8.8 %
NEUTROPHILS # BLD AUTO: 6.5 K/UL
NEUTROPHILS NFR BLD: 55.5 %
NRBC BLD-RTO: 0 /100 WBC
PLATELET # BLD AUTO: 216 K/UL
PMV BLD AUTO: 9.1 FL
POTASSIUM SERPL-SCNC: 5.5 MMOL/L
PREALB SERPL-MCNC: 31 MG/DL
PROT SERPL-MCNC: 8.1 G/DL
RBC # BLD AUTO: 5.36 M/UL
SODIUM SERPL-SCNC: 143 MMOL/L
WBC # BLD AUTO: 11.7 K/UL

## 2018-07-09 PROCEDURE — 84134 ASSAY OF PREALBUMIN: CPT

## 2018-07-09 PROCEDURE — 36415 COLL VENOUS BLD VENIPUNCTURE: CPT

## 2018-07-09 PROCEDURE — 99213 OFFICE O/P EST LOW 20 MIN: CPT | Mod: PBBFAC | Performed by: UROLOGY

## 2018-07-09 PROCEDURE — 99215 OFFICE O/P EST HI 40 MIN: CPT | Mod: S$PBB,,, | Performed by: UROLOGY

## 2018-07-09 PROCEDURE — 85025 COMPLETE CBC W/AUTO DIFF WBC: CPT

## 2018-07-09 PROCEDURE — 80053 COMPREHEN METABOLIC PANEL: CPT

## 2018-07-09 PROCEDURE — 99999 PR PBB SHADOW E&M-EST. PATIENT-LVL III: CPT | Mod: PBBFAC,,, | Performed by: UROLOGY

## 2018-07-09 NOTE — TELEPHONE ENCOUNTER
----- Message from oSnia Felipe RN sent at 7/9/2018  5:06 PM CDT -----  Contact: self  [phone]      ----- Message -----  From: Cori Arce MA  Sent: 7/9/2018   2:20 PM  To: [facility] Staff    Needs Advice    Reason for call:    The medication is tobramycin 0.3% / ketorolac 0.04% / durezol 0.05%  Communication Preference:  Phone# above  Additional Information:  This is the ones I didn't have this morning

## 2018-07-09 NOTE — PROGRESS NOTES
"Subjective:       Patient ID: Connor Marie is a 77 y.o. male.    Chief Complaint: Bladder Cancer      HPI: Connor Marie is a 77 y.o. White male who returns today in follow-up for management of BCG-refractory non-muscle invasive bladder cancer.    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:    9/2014: Right nephroureterectomy for high-grade Ta UTUC  7/2015: High-grade Ta bladder cancer  11/2015: induction course #1 (6/6) BCG  Early 2016: maintenance course of BCG (3/3)  4/2016: PUNLMP  6-7/2016: induction course of BCG (4/6)  7/2016: low-grade Ta bladder cancer  10/2016: multifocal HGTa bladder cancer  1/2017: HGTa bladder cancer  5/2017: LGTa bladder cancer  6/2017: HGTa bladder cancer  7-8/2017: repeat induction BCG  8/2017-3/2018: monthly BCG  4/2018: recurrent HGTa disease  7/2018: recurrent, multifocal high-grade urothelial cancer, now involving the penile urethra    8/2016-3/2017: Patient has received BCG every month once a month for eight consecutive months.    The patient has a significant history of smoking, having quit in 2014.    The patient returns today to discuss again options for his recurrent, BCG refractory bladder cancer. The patient has undergone multiple rounds of BCG and still has persistent disease.    Review of patient's allergies indicates:   Allergen Reactions    Penicillins      [employer] "HAD A MILD REACTION"  "Unsure if I am still allergic but I do not want to take the chance."       Current Outpatient Prescriptions   Medication Sig Dispense Refill    docusate sodium (COLACE) 100 MG capsule Take 1 capsule (100 mg total) by mouth 2 (two) times daily.  0    fluticasone-vilanterol (BREO) 100-25 mcg/dose diskus inhaler Inhale 1 puff into the lungs.      PROAIR HFA 90 mcg/actuation inhaler   0    SITagliptan (JANUVIA) 25 MG Tab Take 25 mg by mouth.      tamsulosin (FLOMAX) 0.4 mg Cp24   0     No current " facility-administered medications for this visit.        Past Medical History:   Diagnosis Date    Allergy     Bladder cancer     COPD (chronic obstructive pulmonary disease)     Diabetes mellitus     Disorder of kidney and ureter     Kidney mass     Mass of upper lobe of right lung 7/13/2017    Panlobular emphysema 7/13/2017    SOB (shortness of breath)     Spontaneous pneumothorax 7/13/2017       Past Surgical History:   Procedure Laterality Date    BACK SURGERY      BLADDER SURGERY      cataracts      CHOLECYSTECTOMY      CYSTOSCOPY      NEPHRECTOMY      rt nephrectomy      TURBT         Family History   Problem Relation Age of Onset    Colon cancer Mother     Stroke Father     Diabetes Father     Brain cancer Sister     Scleroderma Sister     Leukemia Brother     Prostate cancer Brother        Review of Systems    Review of Systems   Constitutional: Negative for fever, chills, activity change, appetite change and unexpected weight change.   HENT: Negative for congestion, nosebleeds, sneezing, sore throat and trouble swallowing.    Eyes: Negative for pain, discharge, redness and visual disturbance.   Respiratory: Negative for cough, choking, chest tightness and shortness of breath.    Cardiovascular: Negative for chest pain, palpitations and leg swelling.   Gastrointestinal: Negative for nausea, vomiting, abdominal pain, diarrhea, blood in stool, abdominal distention and anal bleeding.  Genitourinary: As documented per HPI   Endocrine: Negative for cold intolerance, heat intolerance, polydipsia, polyphagia and polyuria.   Musculoskeletal: Negative for myalgias, gait problem, neck pain and neck stiffness.   Skin: Negative for color change, pallor, rash and wound.   Allergic/Immunologic: Negative for immunocompromised state.   Neurological: Negative for seizures, facial asymmetry, speech difficulty, weakness and light-headedness.   Hematological: Negative for adenopathy. Does not  bruise/bleed easily.   Psychiatric/Behavioral: Negative for hallucinations, behavioral problems, self-injury and agitation. The patient is not hyperactive.    All other systems were reviewed and were negative.      Objective:     Vitals:    07/09/18 0720   BP: (!) 156/73   Pulse: 69   Resp: 16     Physical Exam   Vitals reviewed.  Constitutional: He is oriented to person, place, and time. He appears well-developed and well-nourished. No distress.   HENT:   Head: Normocephalic and atraumatic.   Right Ear: External ear normal.   Left Ear: External ear normal.   Nose: Nose normal.   Eyes: EOM are normal. Pupils are equal, round, and reactive to light. Right eye exhibits no discharge. Left eye exhibits no discharge.   Neck: Normal range of motion. Neck supple. No tracheal deviation present. No thyroid enlargement or tenderness.  Cardiovascular: Regular rhythm and intact distal pulses. No signs of peripheral edema.   Pulmonary/Chest: Effort normal and breath sounds normal. No stridor. No respiratory distress. He has no wheezes.   Abdominal: Soft. Bowel sounds are normal. He exhibits no distension. There is no tenderness. Hernia confirmed negative in the right inguinal area and confirmed negative in the left inguinal area. No hepatic or splenic enlargement. Healed nephroureterectomy scars.  Genitourinary: Penis normal. Right testis shows no mass, no swelling and no tenderness. Right testis is descended. Left testis shows no mass, no swelling and no tenderness. Left testis is descended. Circumcised. No phimosis or hypospadias.   SHANNON: Deferred.  Musculoskeletal: Normal range of motion. He exhibits no edema.   Neurological: He is alert and oriented to person, place, and time. He exhibits normal muscle tone. Coordination normal.   Skin: Skin is warm. No rash noted.   Lymphatic: No palpable lymph nodes.  Psychiatric: He has a normal mood and affect. His behavior is normal. Judgment and thought content normal.     No results  found for: PSA  Lab Results   Component Value Date    CREATININE 2.2 (H) 06/12/2017     Lab Results   Component Value Date    EGFRNONAA 28 (A) 06/12/2017     Lab Results   Component Value Date    ESTGFRAFRICA 32 (A) 06/12/2017     I personally reviewed all the patient's films.    Assessment:       1. Malignant neoplasm of overlapping sites of bladder    2. Urethral cancer    3. CKD (chronic kidney disease), stage III    4. Pulmonary lesion    5. Neoplasm of bladder        Plan:     Connor was seen today for bladder cancer.    Diagnoses and all orders for this visit:    Malignant neoplasm of overlapping sites of bladder  -     CBC auto differential; Future  -     Comprehensive metabolic panel; Future  -     Albumin; Future  -     Prealbumin; Future    Urethral cancer  -     CBC auto differential; Future  -     Comprehensive metabolic panel; Future  -     Albumin; Future  -     Prealbumin; Future    CKD (chronic kidney disease), stage III  -     CBC auto differential; Future  -     Comprehensive metabolic panel; Future  -     Albumin; Future  -     Prealbumin; Future    Pulmonary lesion    Neoplasm of bladder  -     CT Chest Without Contrast; Future  -     CT Abdomen Pelvis  Without Contrast; Future    The patient continues to have recurrent, BCG-refractory non-muscle invasive bladder cancer despite repeat induction and maintenance BCG.    I had a lengthy discussion with the patient and his family today regarding implications of a diagnosis of urothelial carcinoma of the bladder, i.e, bladder cancer.    I explained that individuals with non-muscle invasive bladder cancer account for 75-80% of those with bladder cancer.  Nearly all of such patients exhibit urothelial carcinoma histology (previously known as TCC).  Management of patients with this condition focuses on prevention of disease progression, since effectiveness of salvage therapies for patients with systemic urothelial carcinoma is currently extremely  limited.  Indeed, we discussed that approximately 15-25% of all-comers with non-muscle invasive disease will progress to muscle-invasive pathology.  I explained that muscle-invasion is considered the gateway to metastatic progression; however, a small but real percentage of patients can progress to systemic disease without ever demonstrating evidence of muscle-invasive pathology.       Furthermore we discussed that the majority of patients with urothelial carcinoma will exhibit tumor recurrence (up to 75%).  All surfaces of the patients  tract (collecting system of kidney, ureters, bladder, portion of urethera) are susceptible to recurrences and must be monitored closely for the patients life-time.      We discussed that tumor grade and tumor stage in the setting of a properly-performed transurethral resection are the variables that largely guide prognostication and treatment.  Some of the other risk factors include presence of CIS, multifocality, tumor size >3 cm, lymphovascular invasion, and rapid tumor recurrence.   I emphasized that endoscopic management along with upper tract surveillance are the mainstay treatments of non-muscle invasive bladder cancer.  In addition, as necessary, intravesical chemo/immunotherapy is utilized.  For instance, following endoscopic resection, Level 1 evidence exists to show that a single, prophylactic instillation of intravesical Mitomycin C results in a significant reduction (up to 39% in a recent metaanalysis of 7 randomized trials) of tumor recurrence.  For patients with high risk disease, induction and at times maintenance intravesical therapy with Baccillus Calmette-Kym (BCG) is indicated.  Furthermore, we briefly discussed the concept of early cystectomy for patients with high risk non-muscle invasive bladder cancer.    At this point, the patient's best option is radical cystectomy and urethrectomy. I strongly encouraged the patient to consider and choose this option.  Salvage gemcitabine is also option, however in light of the patient's urethral disease now, I am less hopeful of its ability to salvage the patient's urothelial disease.    I had a long and candid conversation with the patient and his family about his situation and what I think is the best option for him from a cancer standpoint: radical cystectomy. Nevertheless, the patient wants to think about it some more and discuss it with his family. In the meantime, I will update his cross-sectional imaging to ensure that he still has confined disease.    We will talk again later this week once we have these imaging studies.    I answered all their questions, and the patient stated that he would let me know how he wishes to proceed.    I encouraged him or any of his family members to call or email me with questions and/or concerns.    I spent 60 minutes with the patient of which more than half was spent in coordinating the patient's care as well as in direct consultation with the patient in regards to our treatment and plan.

## 2018-07-09 NOTE — PATIENT INSTRUCTIONS
Understanding Bladder Cancer    The urinary system includes the kidneys, ureters, bladder, and urethra. It makes, stores, and gets rid of liquid waste called urine. The two kidneys filter blood to collect waste and make urine. The ureters are small tubes that carry urine from each kidney to the bladder. The bladder is where urine is stored before it leaves the body. The urethra is the tube urine goes through to leave the body.   Bladder cancer means that certain cells in the bladder have changed in ways that aren't normal.  When bladder cancer forms  Cancer is a disease that causes cells to change and multiply out of control. The multiplying cells may form a lump of tissue (tumor). With time, the cancer cells destroy healthy tissue. They may spread to other parts of the body. Why some cells become cancerous is not always clear. But bladder cancer is strongly linked to cigarette smoking. The longer a person smokes and the more a person smokes, the greater that persons chances of developing bladder cancer.  Types of cancer that may form  Bladder cancer may grow in different ways:  · Papillary tumors stick out from the bladder lining on a stalk. They tend to grow into the bladder cavity, away from the bladder wall, instead of deeper into the layers of the bladder wall.  · Flat tumors do not stick out from the bladder lining. These tumors are much more likely than papillary tumors to grow deeper into the layers of the bladder wall.  · Carcinoma in situ (CIS) is a cancerous patch of cells that is only in the inner layer of the bladder lining and has not spread to deeper tissue. The patch may look almost normal or may look inflamed.    Each type of tumor can be present in one or more areas of the bladder, and more than one type can be present at the same time.  Date Last Reviewed: 2/17/2016  © 3812-8221 [employer]. [address] [place] [address], [place] [address]. All rights reserved. This information is not  intended as a substitute for professional medical care. Always follow your healthcare professional's instructions.

## 2018-07-10 ENCOUNTER — HOSPITAL ENCOUNTER (OUTPATIENT)
Dept: RADIOLOGY | Facility: HOSPITAL | Age: 78
Discharge: HOME | End: 2018-07-10
Attending: UROLOGY
Payer: MEDICARE

## 2018-07-10 DIAGNOSIS — D49.4 NEOPLASM OF BLADDER: ICD-10-CM

## 2018-07-10 PROCEDURE — 74176 CT ABD & PELVIS W/O CONTRAST: CPT | Mod: 26,,, | Performed by: RADIOLOGY

## 2018-07-10 PROCEDURE — 71250 CT THORAX DX C-: CPT | Mod: TC,PO

## 2018-07-10 PROCEDURE — 74176 CT ABD & PELVIS W/O CONTRAST: CPT | Mod: TC,PO

## 2018-07-10 PROCEDURE — 71250 CT THORAX DX C-: CPT | Mod: 26,,, | Performed by: RADIOLOGY

## 2018-07-11 DIAGNOSIS — C67.8 MALIGNANT NEOPLASM OF OVERLAPPING SITES OF BLADDER: Primary | ICD-10-CM

## 2018-07-13 NOTE — PRE-PROCEDURE INSTRUCTIONS
PreOp Instructions given:   - Verbal medication information (what to hold and what to take)   - NPO guidelines   - Arrival place directions given; time to be given the day before procedure by the   Surgeon's Office   - Bathing with antibacterial soap   - Don't wear any jewelry or bring any valuables AM of surgery   - No makeup or moisturizer to face   - No perfume/cologne, powder, lotions or aftershave   Pt. verbalized understanding.   Denies any  history of side effects or issues with anesthesia or sedation.

## 2018-07-16 ENCOUNTER — ANESTHESIA EVENT (OUTPATIENT)
Dept: SURGERY | Facility: HOSPITAL | Age: 78
End: 2018-07-16
Payer: MEDICARE

## 2018-07-17 ENCOUNTER — ANESTHESIA (OUTPATIENT)
Dept: SURGERY | Facility: HOSPITAL | Age: 78
End: 2018-07-17
Payer: MEDICARE

## 2018-07-17 ENCOUNTER — HOSPITAL ENCOUNTER (OUTPATIENT)
Facility: HOSPITAL | Age: 78
Discharge: HOME | End: 2018-07-17
Attending: UROLOGY | Admitting: UROLOGY
Payer: MEDICARE

## 2018-07-17 ENCOUNTER — SURGERY (OUTPATIENT)
Age: 78
End: 2018-07-17

## 2018-07-17 VITALS
RESPIRATION RATE: 15 BRPM | OXYGEN SATURATION: 95 % | TEMPERATURE: 98 F | HEART RATE: 70 BPM | HEIGHT: 71 IN | BODY MASS INDEX: 28.7 KG/M2 | DIASTOLIC BLOOD PRESSURE: 85 MMHG | WEIGHT: 205 LBS | SYSTOLIC BLOOD PRESSURE: 162 MMHG

## 2018-07-17 DIAGNOSIS — C67.8 MALIGNANT NEOPLASM OF OVERLAPPING SITES OF BLADDER: ICD-10-CM

## 2018-07-17 DIAGNOSIS — C67.9 BLADDER CANCER: Primary | ICD-10-CM

## 2018-07-17 LAB
ABO + RH BLD: NORMAL
BLD GP AB SCN CELLS X3 SERPL QL: NORMAL
POCT GLUCOSE: 104 MG/DL (ref 70–110)
POCT GLUCOSE: 95 MG/DL (ref 70–110)

## 2018-07-17 PROCEDURE — D9220A PRA ANESTHESIA: Mod: ANES,,, | Performed by: ANESTHESIOLOGY

## 2018-07-17 PROCEDURE — 25000003 PHARM REV CODE 250: Performed by: NURSE ANESTHETIST, CERTIFIED REGISTERED

## 2018-07-17 PROCEDURE — 88309 TISSUE EXAM BY PATHOLOGIST: CPT | Mod: 26,,, | Performed by: PATHOLOGY

## 2018-07-17 PROCEDURE — 63600175 PHARM REV CODE 636 W HCPCS: Performed by: NURSE ANESTHETIST, CERTIFIED REGISTERED

## 2018-07-17 PROCEDURE — D9220A PRA ANESTHESIA: Mod: CRNA,,, | Performed by: NURSE ANESTHETIST, CERTIFIED REGISTERED

## 2018-07-17 PROCEDURE — 82962 GLUCOSE BLOOD TEST: CPT | Performed by: UROLOGY

## 2018-07-17 PROCEDURE — 36000707: Performed by: UROLOGY

## 2018-07-17 PROCEDURE — 86901 BLOOD TYPING SEROLOGIC RH(D): CPT

## 2018-07-17 PROCEDURE — 71000016 HC POSTOP RECOV ADDL HR: Performed by: UROLOGY

## 2018-07-17 PROCEDURE — 37000009 HC ANESTHESIA EA ADD 15 MINS: Performed by: UROLOGY

## 2018-07-17 PROCEDURE — 36000706: Performed by: UROLOGY

## 2018-07-17 PROCEDURE — 25000003 PHARM REV CODE 250

## 2018-07-17 PROCEDURE — 52240 CYSTOSCOPY AND TREATMENT: CPT | Mod: ,,, | Performed by: UROLOGY

## 2018-07-17 PROCEDURE — 63600175 PHARM REV CODE 636 W HCPCS: Performed by: ANESTHESIOLOGY

## 2018-07-17 PROCEDURE — 63600175 PHARM REV CODE 636 W HCPCS

## 2018-07-17 PROCEDURE — 63600175 PHARM REV CODE 636 W HCPCS: Performed by: STUDENT IN AN ORGANIZED HEALTH CARE EDUCATION/TRAINING PROGRAM

## 2018-07-17 PROCEDURE — 71000015 HC POSTOP RECOV 1ST HR: Performed by: UROLOGY

## 2018-07-17 PROCEDURE — 88309 TISSUE EXAM BY PATHOLOGIST: CPT | Performed by: PATHOLOGY

## 2018-07-17 PROCEDURE — 71000044 HC DOSC ROUTINE RECOVERY FIRST HOUR: Performed by: UROLOGY

## 2018-07-17 PROCEDURE — 37000008 HC ANESTHESIA 1ST 15 MINUTES: Performed by: UROLOGY

## 2018-07-17 RX ORDER — PHENYLEPHRINE HYDROCHLORIDE 10 MG/ML
INJECTION INTRAVENOUS
Status: DISCONTINUED | OUTPATIENT
Start: 2018-07-17 | End: 2018-07-17

## 2018-07-17 RX ORDER — LIDOCAINE HCL/PF 100 MG/5ML
SYRINGE (ML) INTRAVENOUS
Status: DISCONTINUED | OUTPATIENT
Start: 2018-07-17 | End: 2018-07-17

## 2018-07-17 RX ORDER — SODIUM CHLORIDE 9 MG/ML
INJECTION, SOLUTION INTRAVENOUS CONTINUOUS PRN
Status: DISCONTINUED | OUTPATIENT
Start: 2018-07-17 | End: 2018-07-17

## 2018-07-17 RX ORDER — NEOSTIGMINE METHYLSULFATE 1 MG/ML
INJECTION, SOLUTION INTRAVENOUS
Status: DISCONTINUED | OUTPATIENT
Start: 2018-07-17 | End: 2018-07-17

## 2018-07-17 RX ORDER — HYDROCODONE BITARTRATE AND ACETAMINOPHEN 5; 325 MG/1; MG/1
1 TABLET ORAL EVERY 6 HOURS PRN
Status: DISCONTINUED | OUTPATIENT
Start: 2018-07-17 | End: 2018-07-17 | Stop reason: HOSPADM

## 2018-07-17 RX ORDER — GLYCOPYRROLATE 0.2 MG/ML
INJECTION INTRAMUSCULAR; INTRAVENOUS
Status: DISCONTINUED | OUTPATIENT
Start: 2018-07-17 | End: 2018-07-17

## 2018-07-17 RX ORDER — POLYETHYLENE GLYCOL 3350 17 G/17G
17 POWDER, FOR SOLUTION ORAL DAILY
Qty: 30 PACKET | Refills: 0 | Status: SHIPPED | OUTPATIENT
Start: 2018-07-17 | End: 2018-08-20

## 2018-07-17 RX ORDER — SODIUM CHLORIDE 9 MG/ML
INJECTION, SOLUTION INTRAVENOUS CONTINUOUS
Status: DISCONTINUED | OUTPATIENT
Start: 2018-07-17 | End: 2018-07-17 | Stop reason: HOSPADM

## 2018-07-17 RX ORDER — FENTANYL CITRATE 50 UG/ML
INJECTION, SOLUTION INTRAMUSCULAR; INTRAVENOUS
Status: DISCONTINUED | OUTPATIENT
Start: 2018-07-17 | End: 2018-07-17

## 2018-07-17 RX ORDER — HYOSCYAMINE SULFATE 0.12 MG/1
0.12 TABLET SUBLINGUAL EVERY 4 HOURS PRN
Qty: 20 TABLET | Refills: 1 | Status: SHIPPED | OUTPATIENT
Start: 2018-07-17 | End: 2018-08-20

## 2018-07-17 RX ORDER — HYDROCODONE BITARTRATE AND ACETAMINOPHEN 5; 325 MG/1; MG/1
1 TABLET ORAL EVERY 6 HOURS PRN
Qty: 11 TABLET | Refills: 0 | Status: SHIPPED | OUTPATIENT
Start: 2018-07-17 | End: 2018-07-27

## 2018-07-17 RX ORDER — HYOSCYAMINE SULFATE 0.12 MG/1
0.12 TABLET SUBLINGUAL EVERY 4 HOURS PRN
Status: DISCONTINUED | OUTPATIENT
Start: 2018-07-17 | End: 2018-07-17 | Stop reason: HOSPADM

## 2018-07-17 RX ORDER — CIPROFLOXACIN 2 MG/ML
400 INJECTION, SOLUTION INTRAVENOUS
Status: COMPLETED | OUTPATIENT
Start: 2018-07-17 | End: 2018-07-17

## 2018-07-17 RX ORDER — PROPOFOL 10 MG/ML
VIAL (ML) INTRAVENOUS
Status: DISCONTINUED | OUTPATIENT
Start: 2018-07-17 | End: 2018-07-17

## 2018-07-17 RX ORDER — HYOSCYAMINE SULFATE 0.12 MG/1
TABLET SUBLINGUAL
Status: COMPLETED
Start: 2018-07-17 | End: 2018-07-17

## 2018-07-17 RX ORDER — HYDROCODONE BITARTRATE AND ACETAMINOPHEN 5; 325 MG/1; MG/1
TABLET ORAL
Status: COMPLETED
Start: 2018-07-17 | End: 2018-07-17

## 2018-07-17 RX ORDER — ROCURONIUM BROMIDE 10 MG/ML
INJECTION, SOLUTION INTRAVENOUS
Status: DISCONTINUED | OUTPATIENT
Start: 2018-07-17 | End: 2018-07-17

## 2018-07-17 RX ORDER — SODIUM CHLORIDE 0.9 % (FLUSH) 0.9 %
3 SYRINGE (ML) INJECTION
Status: DISCONTINUED | OUTPATIENT
Start: 2018-07-17 | End: 2018-07-17 | Stop reason: HOSPADM

## 2018-07-17 RX ORDER — EPHEDRINE SULFATE 50 MG/ML
INJECTION, SOLUTION INTRAVENOUS
Status: DISCONTINUED | OUTPATIENT
Start: 2018-07-17 | End: 2018-07-17

## 2018-07-17 RX ORDER — FENTANYL CITRATE 50 UG/ML
50 INJECTION, SOLUTION INTRAMUSCULAR; INTRAVENOUS EVERY 5 MIN PRN
Status: DISCONTINUED | OUTPATIENT
Start: 2018-07-17 | End: 2018-07-17 | Stop reason: HOSPADM

## 2018-07-17 RX ADMIN — CIPROFLOXACIN 400 MG: 2 INJECTION, SOLUTION INTRAVENOUS at 12:07

## 2018-07-17 RX ADMIN — FENTANYL CITRATE 50 MCG: 50 INJECTION, SOLUTION INTRAMUSCULAR; INTRAVENOUS at 03:07

## 2018-07-17 RX ADMIN — PHENYLEPHRINE HYDROCHLORIDE 50 MCG: 10 INJECTION INTRAVENOUS at 02:07

## 2018-07-17 RX ADMIN — HYOSCYAMINE SULFATE 0.12 MG: 0.12 TABLET SUBLINGUAL at 04:07

## 2018-07-17 RX ADMIN — PROPOFOL 40 MG: 10 INJECTION, EMULSION INTRAVENOUS at 03:07

## 2018-07-17 RX ADMIN — PROPOFOL 200 MG: 10 INJECTION, EMULSION INTRAVENOUS at 01:07

## 2018-07-17 RX ADMIN — NEOSTIGMINE METHYLSULFATE 5 MG: 1 INJECTION INTRAVENOUS at 03:07

## 2018-07-17 RX ADMIN — HYDROCODONE BITARTRATE AND ACETAMINOPHEN 1 TABLET: 5; 325 TABLET ORAL at 04:07

## 2018-07-17 RX ADMIN — ROCURONIUM BROMIDE 20 MG: 10 INJECTION, SOLUTION INTRAVENOUS at 02:07

## 2018-07-17 RX ADMIN — LIDOCAINE HYDROCHLORIDE 100 MG: 20 INJECTION, SOLUTION INTRAVENOUS at 01:07

## 2018-07-17 RX ADMIN — FENTANYL CITRATE 50 MCG: 50 INJECTION INTRAMUSCULAR; INTRAVENOUS at 04:07

## 2018-07-17 RX ADMIN — ROCURONIUM BROMIDE 30 MG: 10 INJECTION, SOLUTION INTRAVENOUS at 01:07

## 2018-07-17 RX ADMIN — FENTANYL CITRATE 50 MCG: 50 INJECTION, SOLUTION INTRAMUSCULAR; INTRAVENOUS at 01:07

## 2018-07-17 RX ADMIN — ROCURONIUM BROMIDE 10 MG: 10 INJECTION, SOLUTION INTRAVENOUS at 03:07

## 2018-07-17 RX ADMIN — FENTANYL CITRATE 50 MCG: 50 INJECTION, SOLUTION INTRAMUSCULAR; INTRAVENOUS at 02:07

## 2018-07-17 RX ADMIN — SODIUM CHLORIDE: 0.9 INJECTION, SOLUTION INTRAVENOUS at 01:07

## 2018-07-17 RX ADMIN — HYOSCYAMINE SULFATE 0.12 MG: 0.12 TABLET ORAL; SUBLINGUAL at 04:07

## 2018-07-17 RX ADMIN — GLYCOPYRROLATE 0.6 MG: 0.2 INJECTION, SOLUTION INTRAMUSCULAR; INTRAVENOUS at 03:07

## 2018-07-17 RX ADMIN — SODIUM CHLORIDE, SODIUM GLUCONATE, SODIUM ACETATE, POTASSIUM CHLORIDE, MAGNESIUM CHLORIDE, SODIUM PHOSPHATE, DIBASIC, AND POTASSIUM PHOSPHATE: .53; .5; .37; .037; .03; .012; .00082 INJECTION, SOLUTION INTRAVENOUS at 03:07

## 2018-07-17 RX ADMIN — EPHEDRINE SULFATE 15 MG: 50 INJECTION, SOLUTION INTRAMUSCULAR; INTRAVENOUS; SUBCUTANEOUS at 02:07

## 2018-07-17 NOTE — TRANSFER OF CARE
"Anesthesia Transfer of Care Note    Patient: Connor Marie    Procedure(s) Performed: Procedure(s) (LRB):  RESECTION,NEOPLASM,BLADDER,TRANSURETHRAL (N/A)    Patient location: [facility]    Anesthesia Type: general    Transport from OR: Transported from OR on 6-10 L/min O2 by face mask with adequate spontaneous ventilation    Post pain: adequate analgesia    Post assessment: no apparent anesthetic complications and tolerated procedure well    Post vital signs: stable    Level of consciousness: awake and alert    Nausea/Vomiting: no nausea/vomiting    Complications: none    Transfer of care protocol was followed      Last vitals:   Visit Vitals  BP (!) 152/71 (BP Location: Left arm, Patient Position: Lying)   Pulse 73   Temp 36.7 °C (98 °F) (Oral)   Resp 18   Ht 5' 11" (1.803 m)   Wt 93 kg (205 lb)   SpO2 97%   BMI 28.59 kg/m²     "

## 2018-07-17 NOTE — INTERVAL H&P NOTE
The patient has been examined and the H&P has been reviewed:    I concur with the findings and no changes have occurred since H&P was written.     UA pos for blood, negative for nitrites, leukocytes    Anesthesia/Surgery risks, benefits and alternative options discussed and understood by patient/family.          Active Hospital Problems    Diagnosis  POA    Bladder cancer [C67.9]  Yes    Malignant neoplasm of overlapping sites of bladder [C67.8]  Yes      Resolved Hospital Problems    Diagnosis Date Resolved POA   No resolved problems to display.

## 2018-07-17 NOTE — ANESTHESIA PREPROCEDURE EVALUATION
07/17/2018  Connor Marie is a 77 y.o., male.    Anesthesia Evaluation    I have reviewed the Patient Summary Reports.    I have reviewed the Nursing Notes.   I have reviewed the Medications.     Review of Systems  Anesthesia Hx:  No problems with previous Anesthesia  History of prior surgery of interest to airway management or planning: Denies Family Hx of Anesthesia complications.   Denies Personal Hx of Anesthesia complications.   Hematology/Oncology:        Current/Recent Cancer. (bladder cancer)   Cardiovascular:   Denies Valvular problems/Murmurs.  ECG has been reviewed.    Pulmonary:   COPD    Renal/:  Renal/ Normal  Kidney mass   Hepatic/GI:  Hepatic/GI Normal    Musculoskeletal:  Musculoskeletal Normal    Neurological:  Neurology Normal Denies Seizures.    Endocrine:   Diabetes        Physical Exam  General:  Well nourished    Airway/Jaw/Neck:  Airway Findings: Mouth Opening: Normal Tongue: Normal  General Airway Assessment: Adult  Mallampati: II  Improves to II with phonation.  TM Distance: Normal, at least 6 cm  Jaw/Neck Findings:  Micrognathia: Negative Neck ROM: Normal ROM      Dental:  Dental Findings: upper partial dentures   Chest/Lungs:  Chest/Lungs Findings: Clear to auscultation, Normal Respiratory Rate     Heart/Vascular:  Heart Findings: Rate: Normal  Rhythm: Regular Rhythm  Sounds: Normal  Heart murmur: negative    Abdomen:  Abdomen Findings:  Normal, Nontender, Soft       Mental Status:  Mental Status Findings:  Cooperative, Alert and Oriented         Anesthesia Plan  Type of Anesthesia, risks & benefits discussed:  Anesthesia Type:  general  Patient's Preference:   Intra-op Monitoring Plan:   Intra-op Monitoring Plan Comments:   Post Op Pain Control Plan:   Post Op Pain Control Plan Comments:   Induction:   Inhalation and IV  Beta Blocker:  Patient is not currently on a  Beta-Blocker (No further documentation required).       Informed Consent: Patient understands risks and agrees with Anesthesia plan.  Questions answered. Anesthesia consent signed with patient.  ASA Score: 3     Day of Surgery Review of History & Physical:    H&P update referred to the surgeon.         Ready For Surgery From Anesthesia Perspective.

## 2018-07-17 NOTE — DISCHARGE SUMMARY
[facility]  Discharge Note  Short Stay    Admit Date: 7/17/2018    Discharge Date and Time: 07/17/2018 4:05 PM      Attending Physician: Ernesto Duckworth MD     Discharge Provider: Kurt Busby    Diagnoses:  Active Hospital Problems    Diagnosis  POA    *Malignant neoplasm of overlapping sites of bladder [C67.8]  Yes    Bladder cancer [C67.9]  Yes      Resolved Hospital Problems    Diagnosis Date Resolved POA   No resolved problems to display.       Discharged Condition: good    Hospital Course: Patient was admitted for TURBT and tolerated the procedure well with no complications. The patient was discharged home in good condition on the same day.       Final Diagnoses: Same as principal problem.    Disposition: Home or Self Care    Follow up/Patient Instructions:    Medications:  Reconciled Home Medications: Current Discharge Medication List      START taking these medications    Details   HYDROcodone-acetaminophen (NORCO) 5-325 mg per tablet Take 1 tablet by mouth every 6 (six) hours as needed for Pain.  Qty: 11 tablet, Refills: 0      hyoscyamine (LEVSIN/SL) 0.125 mg Subl Place 1 tablet (0.125 mg total) under the tongue every 4 (four) hours as needed (bladder spasms).  Qty: 20 tablet, Refills: 1      polyethylene glycol (GLYCOLAX) 17 gram PwPk Take 17 g by mouth once daily.  Qty: 30 packet, Refills: 0         CONTINUE these medications which have NOT CHANGED    Details   fluticasone-vilanterol (BREO) 100-25 mcg/dose diskus inhaler Inhale 1 puff into the lungs.      PROAIR HFA 90 mcg/actuation inhaler Refills: 0      SITagliptan (JANUVIA) 25 MG Tab Take 25 mg by mouth.      tamsulosin (FLOMAX) 0.4 mg Cp24 Take 0.4 mg by mouth once daily.   Refills: 0      docusate sodium (COLACE) 100 MG capsule Take 1 capsule (100 mg total) by mouth 2 (two) times daily.  Refills: 0             Discharge Procedure Orders  Call MD for:  persistent nausea and vomiting or diarrhea     Call MD for:  severe uncontrolled  pain     Call MD for:  persistent dizziness, light-headedness, or visual disturbances     Call MD for:   Order Comments: Temperature > 101F       Follow-up Information     Ernesto Duckworth MD In 3 weeks.    Specialty:  Urology  Contact information:  [address]  [facility] [address]  [phone]                   Discharge Procedure Orders (must include Diet, Follow-up, Activity):    Discharge Procedure Orders (must include Diet, Follow-up, Activity)  Call MD for:  persistent nausea and vomiting or diarrhea     Call MD for:  severe uncontrolled pain     Call MD for:  persistent dizziness, light-headedness, or visual disturbances     Call MD for:   Order Comments: Temperature > 101F

## 2018-07-17 NOTE — DISCHARGE INSTRUCTIONS
Discharge Instructions: Caring for Your Indwelling Urinary Catheter  You have been discharged with an indwelling urinary catheter (also called a Rodgers catheter). A catheter is a thin, flexible tube. An indwelling urinary catheter has two parts. The first part is a tube that drains urine from your bladder. The second part is a bag or other device that collects the urine.  The most important thing to remember is that you want to prevent infection. Always wash your hands before handling your catheter bag or tubing.  Draining the bedside bag  · Wash your hands with soap and clean, running water or use an alcohol-based hand  that contains at least 60% alcohol.  · Hold the drainage tube over a toilet or measuring container.  · Unclamp the tube and let the bag drain.  · Dont touch the tip of the drainage tube or let it touch the toilet or container.  Cleaning the drainage tube  · When the bag is empty, clean the tip of the drainage tube with an alcohol wipe.  · Clamp the tube.  · Reinsert the tube into the pocket on the drainage bag.  Cleaning your skin and tubing  · Clean the skin near the catheter with soap and water.  · Wash your genital area from front to back.  · Wash the catheter tubing. Always wash the catheter in the direction away from your body.  · You will be told when and how to change your bag and tubing.  · Dont try to remove the catheter by yourself.  · You may shower with the catheter in place.  Emptying a leg bag  · Wash your hands.  · Remove the stopper on the bag.  · Drain the bag into the toilet or a measuring container. Dont let the tip of the drainage tube touch anything, including your fingers.  · Clean the tip of the drainage tube with alcohol.  · Replace the stopper.  Follow-up care  Make a follow-up appointment as directed by your healthcare provider  When to call your healthcare provider  Call your healthcare provider right away if you have any of the following:  · Chills or fever  above 100.4°F (38°C)  · Leakage around the catheter insertion site  · Increased spasms (uncontrollable twitching) in your legs, abdomen, or bladder. Occasional mild spasms are normal.  · Burning in the urinary tract, penis, or genital area  · Nausea and vomiting  · Aching in the lower back  · Cloudy or bloody (pink or red) urine, sediment or mucus in the urine, or foul-smelling urine   Date Last Reviewed: 1/1/2017 © 2000-2017 [employer]. [address] [place] [address], [place] [address]. All rights reserved. This information is not intended as a substitute for professional medical care. Always follow your healthcare professional's instructions.

## 2018-07-17 NOTE — PLAN OF CARE
D/C instructions given to pt. and family. Pt. And wife instructed on reyes care and how to remove reyes on Thursday. Leg bag applied. They both verb. Understanding. RX for pain given and next time and dose explained. Pt. Tolerating PO fluids. VSS. IV dc'd. Pain level tolerable. Pt. Denies N/V at this time. Wife at  for d/c.

## 2018-07-17 NOTE — OP NOTE
[facility]  Operative Note    Date: 07/17/2018    Pre-Op Diagnosis: bladder tumor  Patient Active Problem List   Diagnosis    Malignant neoplasm of bladder neck    Mass of upper lobe of right lung    Panlobular emphysema    Spontaneous pneumothorax    Bladder cancer    Malignant neoplasm of overlapping sites of bladder         Post-Op Diagnosis: same    Procedure(s) Performed:   1.  TURBT of large sized bladder tumor, > 5 cm  2.  TUR of urethral lesion, < 2 cm      Specimen(s): right lateral wall bladder chips and dome tumor resection chips    Staff Surgeon: Ernesto Duckworth MD    Assistant Surgeon: Niels Amador MD; Kurt Busby MD    Anesthesia: General endotracheal anesthesia    Indications: Connor Marie is a 77 y.o. male with a bladder tumor.  He presents today for surgical resection.      Findings:   1.  Right lateral wall with tumor present. Dome also with large tumor present. Both resected.  2.  SHANNON post-TURBT showed non fixed bladder without palpable masses.    Estimated Blood Loss: min    Drains: 22 Fr reyes catheter    Procedure in detail:  After the risks, benefits and possible complications of the procedure were explained, consents were obtained. The patient was taken to the operating room and placed under anesthesia. Pre-operative antibiotics were administered 30 minutes prior to expected start time. The patient was placed in the dorsal lithotomy position and prepped and draped in the normal and sterile fashion. Time out was performed.      A rigid cystoscope in a 22 Fr sheath was introduced into the bladder per urethra. This passed easily.  The entire urethra was visualized which showed penile urethral tumor present. scope able to pass this into the bladder. Right UO was not present. Left UO seen to have clear efflux from the UO.   Formal cystoscopy was performed which revealed right lateral bladder wall with disease present. Bladder dome also with large lesion  present. Multiple small areas with papillary lesions present.       The resectoscope was then assembled with the visual obturator. This was placed into the bladder via the urethra and the visual obturator was exchanged for the resecting mechanism.  The tumors were then resected, superficially until the base was identified.  Specimens were then removed and passed off the field for pathologic analysis.  The other smaller areas were fulgurated. After this there was no visible disease seen in the bladder.     The bladder was drained and hemostasis was achieved.  The resectoscope was removed.  A 22 Fr reyes catheter was placed with 10 cc in the balloon.  The bladder was then irrigated.    Post-resection bimanual exam revealed non fixed bladder without palpable masses present.       The patient tolerated the procedure well and was transferred to recovery in stable condition.    Disposition:  The patient will follow up with Dr. Duckworth in 2 weeks.  Prescriptions were given for levsin and norco.      Niels Amador MD    As the attending surgeon, Dr. Duckworth was present for the entire procedure and performed all key aspects of the operation.

## 2018-07-18 NOTE — ANESTHESIA POSTPROCEDURE EVALUATION
"Anesthesia Post Evaluation    Patient: Connor Marie    Procedure(s) Performed: Procedure(s) (LRB):  RESECTION,NEOPLASM,BLADDER,TRANSURETHRAL (N/A)    Final Anesthesia Type: general  Patient location during evaluation: PACU  Patient participation: Yes- Able to Participate  Level of consciousness: awake and alert and oriented  Post-procedure vital signs: reviewed and stable  Pain management: adequate  Airway patency: patent  PONV status at discharge: No PONV  Anesthetic complications: no      Cardiovascular status: blood pressure returned to baseline  Respiratory status: unassisted, spontaneous ventilation and room air  Hydration status: euvolemic  Follow-up not needed.        Visit Vitals  BP (!) 162/85   Pulse 70   Temp 36.6 °C (97.9 °F) (Temporal)   Resp 15   Ht 5' 11" (1.803 m)   Wt 93 kg (205 lb)   SpO2 95%   BMI 28.59 kg/m²       Pain/Chelsea Score: Pain Assessment Performed: Yes (7/17/2018  5:12 PM)  Presence of Pain: complains of pain/discomfort (7/17/2018  5:12 PM)  Pain Rating Prior to Med Admin: 9 (7/17/2018  4:30 PM)  Pain Rating Post Med Admin: 4 (7/17/2018  5:12 PM)  Chelsea Score: 10 (7/17/2018  5:12 PM)      "

## 2018-07-19 DIAGNOSIS — C67.8 MALIGNANT NEOPLASM OF OVERLAPPING SITES OF BLADDER: Primary | ICD-10-CM

## 2018-07-19 NOTE — PROGRESS NOTES
Patient of Dr. Curran to scheduled to gemcitabine 2gm weekly x 6 doses then monthly x 10 months.

## 2018-08-13 ENCOUNTER — OFFICE VISIT (OUTPATIENT)
Dept: UROLOGY | Facility: CLINIC | Age: 78
End: 2018-08-13
Payer: MEDICARE

## 2018-08-13 VITALS
WEIGHT: 202.81 LBS | DIASTOLIC BLOOD PRESSURE: 70 MMHG | SYSTOLIC BLOOD PRESSURE: 132 MMHG | HEART RATE: 74 BPM | BODY MASS INDEX: 28.29 KG/M2

## 2018-08-13 DIAGNOSIS — C67.8 MALIGNANT NEOPLASM OF OVERLAPPING SITES OF BLADDER: Primary | ICD-10-CM

## 2018-08-13 DIAGNOSIS — C68.0 URETHRAL CANCER: ICD-10-CM

## 2018-08-13 DIAGNOSIS — N18.30 CKD (CHRONIC KIDNEY DISEASE), STAGE III: ICD-10-CM

## 2018-08-13 PROCEDURE — 96372 THER/PROPH/DIAG INJ SC/IM: CPT | Mod: PBBFAC

## 2018-08-13 PROCEDURE — 99213 OFFICE O/P EST LOW 20 MIN: CPT | Mod: S$PBB,25,, | Performed by: UROLOGY

## 2018-08-13 PROCEDURE — 99999 PR PBB SHADOW E&M-EST. PATIENT-LVL III: CPT | Mod: PBBFAC,,, | Performed by: UROLOGY

## 2018-08-13 PROCEDURE — 99213 OFFICE O/P EST LOW 20 MIN: CPT | Mod: PBBFAC,25 | Performed by: UROLOGY

## 2018-08-13 PROCEDURE — 51720 TREATMENT OF BLADDER LESION: CPT | Mod: S$PBB,,, | Performed by: UROLOGY

## 2018-08-13 PROCEDURE — 51720 TREATMENT OF BLADDER LESION: CPT | Mod: PBBFAC | Performed by: UROLOGY

## 2018-08-13 RX ADMIN — GEMCITABINE HYDROCHLORIDE: 1 INJECTION, POWDER, LYOPHILIZED, FOR SOLUTION INTRAVENOUS at 11:08

## 2018-08-13 NOTE — LETTER
August 13, 2018      Julia Brennan NP  [address]  [facility] [address]           [facility]  [address]  [facility] [address]  Phone: [phone]          Patient: Connor Marie   MR Number: [id number]   YOB: 1940   Date of Visit: 8/13/2018       Dear Julia Brennan:    Thank you for referring Connor Marie to me for evaluation. Attached you will find relevant portions of my assessment and plan of care.    If you have questions, please do not hesitate to call me. I look forward to following Connor Marie along with you.    Sincerely,    Ernesto Duckworth MD    Enclosure  CC:  No Recipients    If you would like to receive this communication electronically, please contact [email or web address] or [phone] to request more information on [employer] access.    For providers and/or their staff who would like to refer a patient to [facility], please contact us through our one-stop-shop provider referral line, [facility] , at [phone].    If you feel you have received this communication in error or would no longer like to receive these types of communications, please e-mail [email or web address]

## 2018-08-13 NOTE — PATIENT INSTRUCTIONS
Understanding Bladder Cancer    The urinary system includes the kidneys, ureters, bladder, and urethra. It makes, stores, and gets rid of liquid waste called urine. The two kidneys filter blood to collect waste and make urine. The ureters are small tubes that carry urine from each kidney to the bladder. The bladder is where urine is stored before it leaves the body. The urethra is the tube urine goes through to leave the body.   Bladder cancer means that certain cells in the bladder have changed in ways that aren't normal.  When bladder cancer forms  Cancer is a disease that causes cells to change and multiply out of control. The multiplying cells may form a lump of tissue (tumor). With time, the cancer cells destroy healthy tissue. They may spread to other parts of the body. Why some cells become cancerous is not always clear. But bladder cancer is strongly linked to cigarette smoking. The longer a person smokes and the more a person smokes, the greater that persons chances of developing bladder cancer.  Types of cancer that may form  Bladder cancer may grow in different ways:  · Papillary tumors stick out from the bladder lining on a stalk. They tend to grow into the bladder cavity, away from the bladder wall, instead of deeper into the layers of the bladder wall.  · Flat tumors do not stick out from the bladder lining. These tumors are much more likely than papillary tumors to grow deeper into the layers of the bladder wall.  · Carcinoma in situ (CIS) is a cancerous patch of cells that is only in the inner layer of the bladder lining and has not spread to deeper tissue. The patch may look almost normal or may look inflamed.    Each type of tumor can be present in one or more areas of the bladder, and more than one type can be present at the same time.  Date Last Reviewed: 2/17/2016  © 4058-2359 [employer]. [address] [place] [address], [place] [address]. All rights reserved. This information is not  intended as a substitute for professional medical care. Always follow your healthcare professional's instructions.

## 2018-08-13 NOTE — PROGRESS NOTES
"Subjective:       Patient ID: Connor Marie is a 77 y.o. male.    Chief Complaint: No chief complaint on file.      HPI: Connor Marie is a 77 y.o. White male who returns today in follow-up for management of his recurrent non-muscle invasive bladder cancer.    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:    9/2014: Right nephroureterectomy for high-grade Ta UTUC  7/2015: High-grade Ta bladder cancer  11/2015: induction course #1 (6/6) BCG  Early 2016: maintenance course of BCG (3/3)  4/2016: PUNLMP  6-7/2016: induction course of BCG (4/6)  7/2016: low-grade Ta bladder cancer  10/2016: multifocal HGTa bladder cancer  1/2017: HGTa bladder cancer  5/2017: LGTa bladder cancer  6/2017: HGTa bladder cancer  7-8/2017: repeat induction BCG  8/2017-3/2018: monthly BCG  4/2018: recurrent HGTa disease  7/2018: recurrent, multifocal high-grade urothelial cancer, now involving the penile urethra  7/2018 (by me): multifocal low-grade Ta bladder cancer    8/2016-3/2017: Patient has received BCG every month once a month for eight consecutive months.    The patient has a significant history of smoking, having quit in 2014.    The patient presents today to begin a course of induction intravesical gemcitabine.    The patient has tolerated prior instillations well.    He is doing well without complaints.    Review of patient's allergies indicates:   Allergen Reactions    Penicillins      [employer] "HAD A MILD REACTION"  "Unsure if I am still allergic but I do not want to take the chance."       Current Outpatient Medications   Medication Sig Dispense Refill    docusate sodium (COLACE) 100 MG capsule Take 1 capsule (100 mg total) by mouth 2 (two) times daily.  0    fluticasone-vilanterol (BREO) 100-25 mcg/dose diskus inhaler Inhale 1 puff into the lungs.      hyoscyamine (LEVSIN/SL) 0.125 mg Subl Place 1 tablet (0.125 mg total) under the tongue every 4 (four) hours as " needed (bladder spasms). 20 tablet 1    polyethylene glycol (GLYCOLAX) 17 gram PwPk Take 17 g by mouth once daily. 30 packet 0    PROAIR HFA 90 mcg/actuation inhaler   0    SITagliptan (JANUVIA) 25 MG Tab Take 25 mg by mouth.      tamsulosin (FLOMAX) 0.4 mg Cp24 Take 0.4 mg by mouth once daily.   0     No current facility-administered medications for this visit.        Past Medical History:   Diagnosis Date    Allergy     Bladder cancer     COPD (chronic obstructive pulmonary disease)     Diabetes mellitus     Disorder of kidney and ureter     Kidney mass     Mass of upper lobe of right lung 7/13/2017    Panlobular emphysema 7/13/2017    SOB (shortness of breath)     Spontaneous pneumothorax 7/13/2017       Past Surgical History:   Procedure Laterality Date    BACK SURGERY      BLADDER SURGERY      cataracts      CHOLECYSTECTOMY      CYSTOSCOPY      NEPHRECTOMY      rt nephrectomy      TURBT         Family History   Problem Relation Age of Onset    Colon cancer Mother     Stroke Father     Diabetes Father     Brain cancer Sister     Scleroderma Sister     Leukemia Brother     Prostate cancer Brother        Review of Systems    Review of Systems   Constitutional: Negative for fever, chills, activity change, appetite change and unexpected weight change.   HENT: Negative for congestion, nosebleeds, sneezing, sore throat and trouble swallowing.    Eyes: Negative for pain, discharge, redness and visual disturbance.   Respiratory: Negative for cough, choking, chest tightness and shortness of breath.    Cardiovascular: Negative for chest pain, palpitations and leg swelling.   Gastrointestinal: Negative for nausea, vomiting, abdominal pain, diarrhea, blood in stool, abdominal distention and anal bleeding.  Genitourinary: As documented per HPI   Endocrine: Negative for cold intolerance, heat intolerance, polydipsia, polyphagia and polyuria.   Musculoskeletal: Negative for myalgias, gait problem,  neck pain and neck stiffness.   Skin: Negative for color change, pallor, rash and wound.   Allergic/Immunologic: Negative for immunocompromised state.   Neurological: Negative for seizures, facial asymmetry, speech difficulty, weakness and light-headedness.   Hematological: Negative for adenopathy. Does not bruise/bleed easily.   Psychiatric/Behavioral: Negative for hallucinations, behavioral problems, self-injury and agitation. The patient is not hyperactive.    All other systems were reviewed and were negative.      Objective:     Vitals:    08/13/18 0902   BP: 132/70   Pulse: 74     Physical Exam   Vitals reviewed.  Constitutional: He is oriented to person, place, and time. He appears well-developed and well-nourished. No distress.   HENT:   Head: Normocephalic and atraumatic.   Right Ear: External ear normal.   Left Ear: External ear normal.   Nose: Nose normal.   Eyes: EOM are normal. Pupils are equal, round, and reactive to light. Right eye exhibits no discharge. Left eye exhibits no discharge.   Neck: Normal range of motion. Neck supple. No tracheal deviation present. No thyroid enlargement or tenderness.  Cardiovascular: Regular rhythm and intact distal pulses. No signs of peripheral edema.   Pulmonary/Chest: Effort normal and breath sounds normal. No stridor. No respiratory distress. He has no wheezes.   Abdominal: Soft. Bowel sounds are normal. He exhibits no distension. There is no tenderness. Hernia confirmed negative in the right inguinal area and confirmed negative in the left inguinal area. No hepatic or splenic enlargement. Healed nephroureterectomy scars.  Genitourinary: Penis normal. Right testis shows no mass, no swelling and no tenderness. Right testis is descended. Left testis shows no mass, no swelling and no tenderness. Left testis is descended. Circumcised. No phimosis or hypospadias.   SHANNON: Deferred.  Musculoskeletal: Normal range of motion. He exhibits no edema.   Neurological: He is alert  and oriented to person, place, and time. He exhibits normal muscle tone. Coordination normal.   Skin: Skin is warm. No rash noted.   Lymphatic: No palpable lymph nodes.  Psychiatric: He has a normal mood and affect. His behavior is normal. Judgment and thought content normal.    The patient's genitalia was prepped and draped in the usual sterile fashion. A Rodgers catheter was inserted, and the bladder was drained. Intravesical gemcitabine was then administered intravesically via the Rodgers catheter. The Rodgers was removed, and the patient was instructed to hold the gemcitabine intravesically for as long as possible, not greater than 2 hours.    No results found for: PSA  Lab Results   Component Value Date    CREATININE 2.4 (H) 07/09/2018     Lab Results   Component Value Date    EGFRNONAA 25.1 (A) 07/09/2018     Lab Results   Component Value Date    ESTGFRAFRICA 29.0 (A) 07/09/2018     I personally reviewed all the patient's films.    Assessment:       1. Malignant neoplasm of overlapping sites of bladder    2. Urethral cancer    3. CKD (chronic kidney disease), stage III        Plan:     Diagnoses and all orders for this visit:    Malignant neoplasm of overlapping sites of bladder    Urethral cancer    CKD (chronic kidney disease), stage III    The patient is here today to begin induction gemcitabine for his recurrent NMIBC.     I had a lengthy discussion again with the patient regarding implications of her diagnosis of non muscle-invasive urothelial carcinoma of the bladder, i.e, bladder cancer.     I explained that individuals with non-muscle invasive bladder cancer account for 75-80% of those with bladder cancer.  Nearly all of such patients exhibit urothelial carcinoma histology (previously known as TCC).  Management of patients with this condition focuses on prevention of disease progression, since effectiveness of salvage therapies for patients with systemic urothelial carcinoma is currently extremely limited.   Indeed, we discussed that approximately 15-25% of all-comers with non-muscle invasive disease will progress to muscle-invasive pathology.  I explained that muscle-invasion is considered the gateway to metastatic progression; however, a small but real percentage of patients can progress to systemic disease without ever demonstrating evidence of muscle-invasive pathology.     The patient tolerated today's instillation well.     He will return next week for his second instillation.     I answered all his questions.     I encouraged him or any of his family members to call or email me with questions and/or concerns.     I spent 20 minutes with the patient of which more than half was spent in coordinating the patient's care as well as in direct consultation with the patient in regards to our treatment and plan.

## 2018-08-20 ENCOUNTER — OFFICE VISIT (OUTPATIENT)
Dept: UROLOGY | Facility: CLINIC | Age: 78
End: 2018-08-20
Payer: MEDICARE

## 2018-08-20 VITALS
SYSTOLIC BLOOD PRESSURE: 160 MMHG | HEIGHT: 71 IN | HEART RATE: 75 BPM | WEIGHT: 202 LBS | BODY MASS INDEX: 28.28 KG/M2 | DIASTOLIC BLOOD PRESSURE: 72 MMHG

## 2018-08-20 DIAGNOSIS — C67.8 MALIGNANT NEOPLASM OF OVERLAPPING SITES OF BLADDER: Primary | ICD-10-CM

## 2018-08-20 DIAGNOSIS — R35.0 FREQUENCY OF URINATION: ICD-10-CM

## 2018-08-20 DIAGNOSIS — N18.30 CKD (CHRONIC KIDNEY DISEASE), STAGE III: ICD-10-CM

## 2018-08-20 PROCEDURE — 99213 OFFICE O/P EST LOW 20 MIN: CPT | Mod: S$PBB,25,, | Performed by: UROLOGY

## 2018-08-20 PROCEDURE — 99999 PR PBB SHADOW E&M-EST. PATIENT-LVL III: CPT | Mod: PBBFAC,,, | Performed by: UROLOGY

## 2018-08-20 PROCEDURE — 51720 TREATMENT OF BLADDER LESION: CPT | Mod: PBBFAC | Performed by: UROLOGY

## 2018-08-20 PROCEDURE — 99213 OFFICE O/P EST LOW 20 MIN: CPT | Mod: PBBFAC | Performed by: UROLOGY

## 2018-08-20 PROCEDURE — 96372 THER/PROPH/DIAG INJ SC/IM: CPT | Mod: PBBFAC

## 2018-08-20 PROCEDURE — 51720 TREATMENT OF BLADDER LESION: CPT | Mod: S$PBB,,, | Performed by: UROLOGY

## 2018-08-20 RX ORDER — OXYBUTYNIN CHLORIDE 5 MG/1
5 TABLET ORAL 3 TIMES DAILY
Qty: 30 TABLET | Refills: 1 | Status: SHIPPED | OUTPATIENT
Start: 2018-08-20 | End: 2019-08-20

## 2018-08-20 RX ADMIN — GEMCITABINE HYDROCHLORIDE: 1 INJECTION, POWDER, LYOPHILIZED, FOR SOLUTION INTRAVENOUS at 10:08

## 2018-08-20 NOTE — PATIENT INSTRUCTIONS
Understanding Bladder Cancer    The urinary system includes the kidneys, ureters, bladder, and urethra. It makes, stores, and gets rid of liquid waste called urine. The two kidneys filter blood to collect waste and make urine. The ureters are small tubes that carry urine from each kidney to the bladder. The bladder is where urine is stored before it leaves the body. The urethra is the tube urine goes through to leave the body.   Bladder cancer means that certain cells in the bladder have changed in ways that aren't normal.  When bladder cancer forms  Cancer is a disease that causes cells to change and multiply out of control. The multiplying cells may form a lump of tissue (tumor). With time, the cancer cells destroy healthy tissue. They may spread to other parts of the body. Why some cells become cancerous is not always clear. But bladder cancer is strongly linked to cigarette smoking. The longer a person smokes and the more a person smokes, the greater that persons chances of developing bladder cancer.  Types of cancer that may form  Bladder cancer may grow in different ways:  · Papillary tumors stick out from the bladder lining on a stalk. They tend to grow into the bladder cavity, away from the bladder wall, instead of deeper into the layers of the bladder wall.  · Flat tumors do not stick out from the bladder lining. These tumors are much more likely than papillary tumors to grow deeper into the layers of the bladder wall.  · Carcinoma in situ (CIS) is a cancerous patch of cells that is only in the inner layer of the bladder lining and has not spread to deeper tissue. The patch may look almost normal or may look inflamed.    Each type of tumor can be present in one or more areas of the bladder, and more than one type can be present at the same time.  Date Last Reviewed: 2/17/2016  © 9625-5334 [employer]. [address] [place] [address], [place] [address]. All rights reserved. This information is not  intended as a substitute for professional medical care. Always follow your healthcare professional's instructions.

## 2018-08-20 NOTE — PROGRESS NOTES
"Subjective:       Patient ID: Connor Marie is a 77 y.o. male.    Chief Complaint: Bladder Cancer      HPI: Connor Marie is a 77 y.o. White male who returns today in follow-up for management of his recurrent non-muscle invasive bladder cancer.    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:    9/2014: Right nephroureterectomy for high-grade Ta UTUC  7/2015: High-grade Ta bladder cancer  11/2015: induction course #1 (6/6) BCG  Early 2016: maintenance course of BCG (3/3)  4/2016: PUNLMP  6-7/2016: induction course of BCG (4/6)  7/2016: low-grade Ta bladder cancer  10/2016: multifocal HGTa bladder cancer  1/2017: HGTa bladder cancer  5/2017: LGTa bladder cancer  6/2017: HGTa bladder cancer  7-8/2017: repeat induction BCG  8/2017-3/2018: monthly BCG  4/2018: recurrent HGTa disease  7/2018: recurrent, multifocal high-grade urothelial cancer, now involving the penile urethra  7/2018 (by me): multifocal low-grade Ta bladder cancer    8/2016-3/2017: Patient has received BCG every month once a month for eight consecutive months.    The patient has a significant history of smoking, having quit in 2014.    The patient returns today to continue a course of induction intravesical gemcitabine.    The patient tolerated last week's instillation well.    He returns today for 2/6 instillation.    He is doing well without complaints.    Review of patient's allergies indicates:   Allergen Reactions    Penicillins      [employer] "HAD A MILD REACTION"  "Unsure if I am still allergic but I do not want to take the chance."       Current Outpatient Medications   Medication Sig Dispense Refill    docusate sodium (COLACE) 100 MG capsule Take 1 capsule (100 mg total) by mouth 2 (two) times daily.  0    fluticasone-vilanterol (BREO) 100-25 mcg/dose diskus inhaler Inhale 1 puff into the lungs.      oxybutynin (DITROPAN) 5 MG Tab Take 1 tablet (5 mg total) by mouth 3 (three) " times daily. 30 tablet 1    SITagliptan (JANUVIA) 25 MG Tab Take 25 mg by mouth.      tamsulosin (FLOMAX) 0.4 mg Cp24 Take 0.4 mg by mouth once daily.   0     No current facility-administered medications for this visit.        Past Medical History:   Diagnosis Date    Allergy     Bladder cancer     COPD (chronic obstructive pulmonary disease)     Diabetes mellitus     Disorder of kidney and ureter     Kidney mass     Mass of upper lobe of right lung 7/13/2017    Panlobular emphysema 7/13/2017    SOB (shortness of breath)     Spontaneous pneumothorax 7/13/2017       Past Surgical History:   Procedure Laterality Date    BACK SURGERY      BLADDER SURGERY      cataracts      CHOLECYSTECTOMY      CYSTOSCOPY      NEPHRECTOMY      rt nephrectomy      TURBT         Family History   Problem Relation Age of Onset    Colon cancer Mother     Stroke Father     Diabetes Father     Brain cancer Sister     Scleroderma Sister     Leukemia Brother     Prostate cancer Brother        Review of Systems    Review of Systems   Constitutional: Negative for fever, chills, activity change, appetite change and unexpected weight change.   HENT: Negative for congestion, nosebleeds, sneezing, sore throat and trouble swallowing.    Eyes: Negative for pain, discharge, redness and visual disturbance.   Respiratory: Negative for cough, choking, chest tightness and shortness of breath.    Cardiovascular: Negative for chest pain, palpitations and leg swelling.   Gastrointestinal: Negative for nausea, vomiting, abdominal pain, diarrhea, blood in stool, abdominal distention and anal bleeding.  Genitourinary: As documented per HPI   Endocrine: Negative for cold intolerance, heat intolerance, polydipsia, polyphagia and polyuria.   Musculoskeletal: Negative for myalgias, gait problem, neck pain and neck stiffness.   Skin: Negative for color change, pallor, rash and wound.   Allergic/Immunologic: Negative for immunocompromised  state.   Neurological: Negative for seizures, facial asymmetry, speech difficulty, weakness and light-headedness.   Hematological: Negative for adenopathy. Does not bruise/bleed easily.   Psychiatric/Behavioral: Negative for hallucinations, behavioral problems, self-injury and agitation. The patient is not hyperactive.    All other systems were reviewed and were negative.      Objective:     Vitals:    08/20/18 0923   BP: (!) 160/72   Pulse: 75     Physical Exam   Vitals reviewed.  Constitutional: He is oriented to person, place, and time. He appears well-developed and well-nourished. No distress.   HENT:   Head: Normocephalic and atraumatic.   Right Ear: External ear normal.   Left Ear: External ear normal.   Nose: Nose normal.   Eyes: EOM are normal. Pupils are equal, round, and reactive to light. Right eye exhibits no discharge. Left eye exhibits no discharge.   Neck: Normal range of motion. Neck supple. No tracheal deviation present. No thyroid enlargement or tenderness.  Cardiovascular: Regular rhythm and intact distal pulses. No signs of peripheral edema.   Pulmonary/Chest: Effort normal and breath sounds normal. No stridor. No respiratory distress. He has no wheezes.   Abdominal: Soft. Bowel sounds are normal. He exhibits no distension. There is no tenderness. Hernia confirmed negative in the right inguinal area and confirmed negative in the left inguinal area. No hepatic or splenic enlargement. Healed nephroureterectomy scars.  Genitourinary: Penis normal. Right testis shows no mass, no swelling and no tenderness. Right testis is descended. Left testis shows no mass, no swelling and no tenderness. Left testis is descended. Circumcised. No phimosis or hypospadias.   SHANNON: Deferred.  Musculoskeletal: Normal range of motion. He exhibits no edema.   Neurological: He is alert and oriented to person, place, and time. He exhibits normal muscle tone. Coordination normal.   Skin: Skin is warm. No rash noted.    Lymphatic: No palpable lymph nodes.  Psychiatric: He has a normal mood and affect. His behavior is normal. Judgment and thought content normal.    The patient's genitalia was prepped and draped in the usual sterile fashion. A Rodgers catheter was inserted, and the bladder was drained. Intravesical gemcitabine was then administered intravesically via the Rodgers catheter. The Rodgers was removed, and the patient was instructed to hold the gemcitabine intravesically for as long as possible, not greater than 2 hours.    No results found for: PSA  Lab Results   Component Value Date    CREATININE 2.4 (H) 07/09/2018     Lab Results   Component Value Date    EGFRNONAA 25.1 (A) 07/09/2018     Lab Results   Component Value Date    ESTGFRAFRICA 29.0 (A) 07/09/2018     I personally reviewed all the patient's films.    Assessment:       1. Malignant neoplasm of overlapping sites of bladder    2. Frequency of urination    3. CKD (chronic kidney disease), stage III        Plan:     Connor was seen today for bladder cancer.    Diagnoses and all orders for this visit:    Malignant neoplasm of overlapping sites of bladder  -     Cancel: NURSING COMMUNICATION; Standing  -     Gemcitabine 2000 mg in 100 ml NS ; by Intravesical route one time.  -     NURSING COMMUNICATION; Standing  -     NURSING COMMUNICATION  -     oxybutynin (DITROPAN) 5 MG Tab; Take 1 tablet (5 mg total) by mouth 3 (three) times daily.    Frequency of urination  -     oxybutynin (DITROPAN) 5 MG Tab; Take 1 tablet (5 mg total) by mouth 3 (three) times daily.    CKD (chronic kidney disease), stage III    Other orders  -     Cancel: NON FORMULARY MEDICATION 2,000 mg; 2,000 mg by Intravesical route one time.  -     NON FORMULARY MEDICATION 2,000 mg; 2,000 mg by Intravesical route one time.    The patient returns today to continue induction gemcitabine for his recurrent NMIBC.     I had a lengthy discussion again with the patient regarding implications of her diagnosis of  non muscle-invasive urothelial carcinoma of the bladder, i.e, bladder cancer.     I explained that individuals with non-muscle invasive bladder cancer account for 75-80% of those with bladder cancer.  Nearly all of such patients exhibit urothelial carcinoma histology (previously known as TCC).  Management of patients with this condition focuses on prevention of disease progression, since effectiveness of salvage therapies for patients with systemic urothelial carcinoma is currently extremely limited.  Indeed, we discussed that approximately 15-25% of all-comers with non-muscle invasive disease will progress to muscle-invasive pathology.  I explained that muscle-invasion is considered the gateway to metastatic progression; however, a small but real percentage of patients can progress to systemic disease without ever demonstrating evidence of muscle-invasive pathology.     The patient tolerated today's instillation well.     He will return next week for his third instillation.     I answered all his questions.     I encouraged him or any of his family members to call or email me with questions and/or concerns.     I spent 20 minutes with the patient of which more than half was spent in coordinating the patient's care as well as in direct consultation with the patient in regards to our treatment and plan.

## 2018-08-27 ENCOUNTER — OFFICE VISIT (OUTPATIENT)
Dept: UROLOGY | Facility: CLINIC | Age: 78
End: 2018-08-27
Payer: MEDICARE

## 2018-08-27 VITALS
SYSTOLIC BLOOD PRESSURE: 139 MMHG | WEIGHT: 205 LBS | HEIGHT: 71 IN | RESPIRATION RATE: 16 BRPM | DIASTOLIC BLOOD PRESSURE: 71 MMHG | BODY MASS INDEX: 28.7 KG/M2 | HEART RATE: 69 BPM

## 2018-08-27 DIAGNOSIS — N18.30 CKD (CHRONIC KIDNEY DISEASE), STAGE III: ICD-10-CM

## 2018-08-27 DIAGNOSIS — C67.8 MALIGNANT NEOPLASM OF OVERLAPPING SITES OF BLADDER: Primary | ICD-10-CM

## 2018-08-27 DIAGNOSIS — C68.0 URETHRAL CANCER: ICD-10-CM

## 2018-08-27 PROCEDURE — 99213 OFFICE O/P EST LOW 20 MIN: CPT | Mod: PBBFAC,25 | Performed by: UROLOGY

## 2018-08-27 PROCEDURE — 51720 TREATMENT OF BLADDER LESION: CPT | Mod: S$PBB,,, | Performed by: UROLOGY

## 2018-08-27 PROCEDURE — 99999 PR PBB SHADOW E&M-EST. PATIENT-LVL III: CPT | Mod: PBBFAC,,, | Performed by: UROLOGY

## 2018-08-27 PROCEDURE — 51720 TREATMENT OF BLADDER LESION: CPT | Mod: PBBFAC | Performed by: UROLOGY

## 2018-08-27 PROCEDURE — 99213 OFFICE O/P EST LOW 20 MIN: CPT | Mod: S$PBB,25,, | Performed by: UROLOGY

## 2018-08-27 NOTE — PATIENT INSTRUCTIONS
Mitomycin injection  What is this medicine?  MITOMYCIN (mye toe MYE sin) is a chemotherapy drug. This medicine is used to treat cancer of the stomach and pancreas.  How should I use this medicine?  This drug is given as an injection or infusion into a vein. It is administered in a hospital or clinic by a specially trained health care professional.  Talk to your pediatrician regarding the use of this medicine in children. Special care may be needed.  What side effects may I notice from receiving this medicine?  Side effects that you should report to your doctor or health care professional as soon as possible:  · allergic reactions like skin rash, itching or hives, swelling of the face, lips, or tongue  · low blood counts - this medicine may decrease the number of white blood cells, red blood cells and platelets. You may be at increased risk for infections and bleeding.  · signs of infection - fever or chills, cough, sore throat, pain or difficulty passing urine  · signs of decreased platelets or bleeding - bruising, pinpoint red spots on the skin, black, tarry stools, blood in the urine  · signs of decreased red blood cells - unusually weak or tired, fainting spells, lightheadedness  · breathing problems  · changes in vision  · chest pain  · confusion  · dry cough  · high blood pressure  · mouth sores  · pain, swelling, redness at site where injected  · pain, tingling, numbness in the hands or feet  · seizures  · swelling of the ankles, feet, hands  · trouble passing urine or change in the amount of urine  Side effects that usually do not require medical attention (report to your doctor or health care professional if they continue or are bothersome):  · diarrhea  · green to blue color of urine  · hair loss  · loss of appetite  · nausea, vomiting  What may interact with this medicine?  · medicines to increase blood counts like filgrastim, pegfilgrastim, sargramostim  · vaccines  What if I miss a dose?  It is  important not to miss your dose. Call your doctor or health care professional if you are unable to keep an appointment.  Where should I keep my medicine?  This drug is given in a hospital or clinic and will not be stored at home.  What should I tell my health care provider before I take this medicine?  They need to know if you have any of these conditions:  · anemia  · bleeding disorder  · infection (especially a virus infection such as chickenpox, cold sores, or herpes)  · kidney disease  · low blood counts like low platelets, red blood cells, white blood cells  · recent radiation therapy  · an unusual or allergic reaction to mitomycin, other chemotherapy agents, other medicines, foods, dyes, or preservatives  · pregnant or trying to get pregnant  · breast-feeding  What should I watch for while using this medicine?  Your condition will be monitored carefully while you are receiving this medicine. You will need important blood work done while you are taking this medicine.  This drug may make you feel generally unwell. This is not uncommon, as chemotherapy can affect healthy cells as well as cancer cells. Report any side effects. Continue your course of treatment even though you feel ill unless your doctor tells you to stop.  Call your doctor or health care professional for advice if you get a fever, chills or sore throat, or other symptoms of a cold or flu. Do not treat yourself. This drug decreases your body's ability to fight infections. Try to avoid being around people who are sick.  This medicine may increase your risk to bruise or bleed. Call your doctor or health care professional if you notice any unusual bleeding.  Be careful brushing and flossing your teeth or using a toothpick because you may get an infection or bleed more easily. If you have any dental work done, tell your dentist you are receiving this medicine.  Avoid taking products that contain aspirin, acetaminophen, ibuprofen, naproxen, or  ketoprofen unless instructed by your doctor. These medicines may hide a fever.  Do not become pregnant while taking this medicine. Women should inform their doctor if they wish to become pregnant or think they might be pregnant. There is a potential for serious side effects to an unborn child. Talk to your health care professional or pharmacist for more information. Do not breast-feed an infant while taking this medicine.  NOTE:This sheet is a summary. It may not cover all possible information. If you have questions about this medicine, talk to your doctor, pharmacist, or health care provider. Copyright© 2017 [employer]

## 2018-08-27 NOTE — PROGRESS NOTES
"Subjective:       Patient ID: Connor Marie is a 77 y.o. male.    Chief Complaint: Bladder Cancer (gem)      HPI: Connor Marie is a 77 y.o. White male who returns today in follow-up for management of his recurrent non-muscle invasive bladder cancer.    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:    9/2014: Right nephroureterectomy for high-grade Ta UTUC  7/2015: High-grade Ta bladder cancer  11/2015: induction course #1 (6/6) BCG  Early 2016: maintenance course of BCG (3/3)  4/2016: PUNLMP  6-7/2016: induction course of BCG (4/6)  7/2016: low-grade Ta bladder cancer  10/2016: multifocal HGTa bladder cancer  1/2017: HGTa bladder cancer  5/2017: LGTa bladder cancer  6/2017: HGTa bladder cancer  7-8/2017: repeat induction BCG  8/2017-3/2018: monthly BCG  4/2018: recurrent HGTa disease  7/2018: recurrent, multifocal high-grade urothelial cancer, now involving the penile urethra  7/2018 (by me): multifocal low-grade Ta bladder cancer    8/2016-3/2017: Patient has received BCG every month once a month for eight consecutive months.    The patient has a significant history of smoking, having quit in 2014.    The patient returns today to continue a course of induction intravesical gemcitabine.    The patient tolerated last week's instillation well.    He returns today for 3/6 instillation.    He is doing well without complaints.    Review of patient's allergies indicates:   Allergen Reactions    Penicillins      [employer] "HAD A MILD REACTION"  "Unsure if I am still allergic but I do not want to take the chance."       Current Outpatient Medications   Medication Sig Dispense Refill    docusate sodium (COLACE) 100 MG capsule Take 1 capsule (100 mg total) by mouth 2 (two) times daily.  0    fluticasone-vilanterol (BREO) 100-25 mcg/dose diskus inhaler Inhale 1 puff into the lungs.      SITagliptan (JANUVIA) 25 MG Tab Take 25 mg by mouth.      tamsulosin " (FLOMAX) 0.4 mg Cp24 Take 0.4 mg by mouth once daily.   0    oxybutynin (DITROPAN) 5 MG Tab Take 1 tablet (5 mg total) by mouth 3 (three) times daily. 30 tablet 1     No current facility-administered medications for this visit.        Past Medical History:   Diagnosis Date    Allergy     Bladder cancer     COPD (chronic obstructive pulmonary disease)     Diabetes mellitus     Disorder of kidney and ureter     Kidney mass     Mass of upper lobe of right lung 7/13/2017    Panlobular emphysema 7/13/2017    SOB (shortness of breath)     Spontaneous pneumothorax 7/13/2017       Past Surgical History:   Procedure Laterality Date    BACK SURGERY      BLADDER SURGERY      cataracts      CHOLECYSTECTOMY      CYSTOSCOPY      NEPHRECTOMY      rt nephrectomy      TURBT         Family History   Problem Relation Age of Onset    Colon cancer Mother     Stroke Father     Diabetes Father     Brain cancer Sister     Scleroderma Sister     Leukemia Brother     Prostate cancer Brother        Review of Systems    Review of Systems   Constitutional: Negative for fever, chills, activity change, appetite change and unexpected weight change.   HENT: Negative for congestion, nosebleeds, sneezing, sore throat and trouble swallowing.    Eyes: Negative for pain, discharge, redness and visual disturbance.   Respiratory: Negative for cough, choking, chest tightness and shortness of breath.    Cardiovascular: Negative for chest pain, palpitations and leg swelling.   Gastrointestinal: Negative for nausea, vomiting, abdominal pain, diarrhea, blood in stool, abdominal distention and anal bleeding.  Genitourinary: As documented per HPI   Endocrine: Negative for cold intolerance, heat intolerance, polydipsia, polyphagia and polyuria.   Musculoskeletal: Negative for myalgias, gait problem, neck pain and neck stiffness.   Skin: Negative for color change, pallor, rash and wound.   Allergic/Immunologic: Negative for  immunocompromised state.   Neurological: Negative for seizures, facial asymmetry, speech difficulty, weakness and light-headedness.   Hematological: Negative for adenopathy. Does not bruise/bleed easily.   Psychiatric/Behavioral: Negative for hallucinations, behavioral problems, self-injury and agitation. The patient is not hyperactive.    All other systems were reviewed and were negative.      Objective:     Vitals:    08/27/18 0959   BP: 139/71   Pulse: 69   Resp: 16     Physical Exam   Vitals reviewed.  Constitutional: He is oriented to person, place, and time. He appears well-developed and well-nourished. No distress.   HENT:   Head: Normocephalic and atraumatic.   Right Ear: External ear normal.   Left Ear: External ear normal.   Nose: Nose normal.   Eyes: EOM are normal. Pupils are equal, round, and reactive to light. Right eye exhibits no discharge. Left eye exhibits no discharge.   Neck: Normal range of motion. Neck supple. No tracheal deviation present. No thyroid enlargement or tenderness.  Cardiovascular: Regular rhythm and intact distal pulses. No signs of peripheral edema.   Pulmonary/Chest: Effort normal and breath sounds normal. No stridor. No respiratory distress. He has no wheezes.   Abdominal: Soft. Bowel sounds are normal. He exhibits no distension. There is no tenderness. Hernia confirmed negative in the right inguinal area and confirmed negative in the left inguinal area. No hepatic or splenic enlargement. Healed nephroureterectomy scars.  Genitourinary: Penis normal. Right testis shows no mass, no swelling and no tenderness. Right testis is descended. Left testis shows no mass, no swelling and no tenderness. Left testis is descended. Circumcised. No phimosis or hypospadias.   SHANNON: Deferred.  Musculoskeletal: Normal range of motion. He exhibits no edema.   Neurological: He is alert and oriented to person, place, and time. He exhibits normal muscle tone. Coordination normal.   Skin: Skin is warm.  No rash noted.   Lymphatic: No palpable lymph nodes.  Psychiatric: He has a normal mood and affect. His behavior is normal. Judgment and thought content normal.    The patient's genitalia was prepped and draped in the usual sterile fashion. A Rodgers catheter was inserted, and the bladder was drained. Intravesical gemcitabine was then administered intravesically via the Rodgers catheter. The Rodgers was removed, and the patient was instructed to hold the gemcitabine intravesically for as long as possible, not greater than 2 hours.    No results found for: PSA  Lab Results   Component Value Date    CREATININE 2.4 (H) 07/09/2018     Lab Results   Component Value Date    EGFRNONAA 25.1 (A) 07/09/2018     Lab Results   Component Value Date    ESTGFRAFRICA 29.0 (A) 07/09/2018     I personally reviewed all the patient's films.    Assessment:       1. Malignant neoplasm of overlapping sites of bladder    2. CKD (chronic kidney disease), stage III    3. Urethral cancer        Plan:     Connor was seen today for bladder cancer.    Diagnoses and all orders for this visit:    Malignant neoplasm of overlapping sites of bladder    CKD (chronic kidney disease), stage III    Urethral cancer    The patient returns today to continue induction gemcitabine for his recurrent NMIBC.     I had a lengthy discussion again with the patient regarding implications of her diagnosis of non muscle-invasive urothelial carcinoma of the bladder, i.e, bladder cancer.     I explained that individuals with non-muscle invasive bladder cancer account for 75-80% of those with bladder cancer.  Nearly all of such patients exhibit urothelial carcinoma histology (previously known as TCC).  Management of patients with this condition focuses on prevention of disease progression, since effectiveness of salvage therapies for patients with systemic urothelial carcinoma is currently extremely limited.  Indeed, we discussed that approximately 15-25% of all-comers with  non-muscle invasive disease will progress to muscle-invasive pathology.  I explained that muscle-invasion is considered the gateway to metastatic progression; however, a small but real percentage of patients can progress to systemic disease without ever demonstrating evidence of muscle-invasive pathology.     The patient tolerated today's instillation well.     He will return next week for his fourth instillation.     I answered all his questions.     I encouraged him or any of his family members to call or email me with questions and/or concerns.     I spent 20 minutes with the patient of which more than half was spent in coordinating the patient's care as well as in direct consultation with the patient in regards to our treatment and plan.

## 2018-09-04 ENCOUNTER — OFFICE VISIT (OUTPATIENT)
Dept: UROLOGY | Facility: CLINIC | Age: 78
End: 2018-09-04
Payer: MEDICARE

## 2018-09-04 VITALS
BODY MASS INDEX: 28.7 KG/M2 | WEIGHT: 205 LBS | TEMPERATURE: 80 F | HEART RATE: 98 BPM | HEIGHT: 71 IN | SYSTOLIC BLOOD PRESSURE: 135 MMHG | DIASTOLIC BLOOD PRESSURE: 69 MMHG

## 2018-09-04 DIAGNOSIS — C67.8 MALIGNANT NEOPLASM OF OVERLAPPING SITES OF BLADDER: Primary | ICD-10-CM

## 2018-09-04 LAB
BILIRUB SERPL-MCNC: ABNORMAL MG/DL
BLOOD URINE, POC: 250
COLOR, POC UA: YELLOW
GLUCOSE UR QL STRIP: ABNORMAL
KETONES UR QL STRIP: ABNORMAL
LEUKOCYTE ESTERASE URINE, POC: ABNORMAL
NITRITE, POC UA: ABNORMAL
PH, POC UA: 5
PROTEIN, POC: ABNORMAL
SPECIFIC GRAVITY, POC UA: 1.01
UROBILINOGEN, POC UA: ABNORMAL

## 2018-09-04 PROCEDURE — 99999 PR PBB SHADOW E&M-EST. PATIENT-LVL IV: CPT | Mod: PBBFAC,,, | Performed by: NURSE PRACTITIONER

## 2018-09-04 PROCEDURE — 99214 OFFICE O/P EST MOD 30 MIN: CPT | Mod: PBBFAC,25 | Performed by: NURSE PRACTITIONER

## 2018-09-04 PROCEDURE — 51720 TREATMENT OF BLADDER LESION: CPT | Mod: S$PBB,,, | Performed by: NURSE PRACTITIONER

## 2018-09-04 PROCEDURE — 51720 TREATMENT OF BLADDER LESION: CPT | Mod: PBBFAC | Performed by: NURSE PRACTITIONER

## 2018-09-04 PROCEDURE — 81002 URINALYSIS NONAUTO W/O SCOPE: CPT | Mod: PBBFAC | Performed by: NURSE PRACTITIONER

## 2018-09-04 PROCEDURE — 99214 OFFICE O/P EST MOD 30 MIN: CPT | Mod: S$PBB,25,, | Performed by: NURSE PRACTITIONER

## 2018-09-04 RX ORDER — ALBUTEROL SULFATE 0.83 MG/ML
2.5 SOLUTION RESPIRATORY (INHALATION) EVERY 6 HOURS PRN
COMMUNITY

## 2018-09-04 RX ADMIN — GEMCITABINE HYDROCHLORIDE: 1 INJECTION, POWDER, LYOPHILIZED, FOR SOLUTION INTRAVENOUS at 09:09

## 2018-09-04 NOTE — PATIENT INSTRUCTIONS
Understanding Bladder Cancer    The urinary system includes the kidneys, ureters, bladder, and urethra. It makes, stores, and gets rid of liquid waste called urine. The two kidneys filter blood to collect waste and make urine. The ureters are small tubes that carry urine from each kidney to the bladder. The bladder is where urine is stored before it leaves the body. The urethra is the tube urine goes through to leave the body.   Bladder cancer means that certain cells in the bladder have changed in ways that aren't normal.  When bladder cancer forms  Cancer is a disease that causes cells to change and multiply out of control. The multiplying cells may form a lump of tissue (tumor). With time, the cancer cells destroy healthy tissue. They may spread to other parts of the body. Why some cells become cancerous is not always clear. But bladder cancer is strongly linked to cigarette smoking. The longer a person smokes and the more a person smokes, the greater that persons chances of developing bladder cancer.  Types of cancer that may form  Bladder cancer may grow in different ways:  · Papillary tumors stick out from the bladder lining on a stalk. They tend to grow into the bladder cavity, away from the bladder wall, instead of deeper into the layers of the bladder wall.  · Flat tumors do not stick out from the bladder lining. These tumors are much more likely than papillary tumors to grow deeper into the layers of the bladder wall.  · Carcinoma in situ (CIS) is a cancerous patch of cells that is only in the inner layer of the bladder lining and has not spread to deeper tissue. The patch may look almost normal or may look inflamed.    Each type of tumor can be present in one or more areas of the bladder, and more than one type can be present at the same time.  Date Last Reviewed: 2/17/2016  © 8399-9823 [employer]. [address] [place] [address], [place] [address]. All rights reserved. This information is not  intended as a substitute for professional medical care. Always follow your healthcare professional's instructions.        Treating Bladder Cancer: Intravesical Therapy    Some types of bladder tumors are hard to remove completely with surgery. These tumors tend to be high grade. This means they are more likely to grow and spread quickly. They may happen in more than one area and they may be flat against the bladder wall. They may have come back after treatment. In these cases, special medicines that kill cancer cells may be put right inside the bladder. This is called intravesical therapy. It may be a choice if you have a hard-to-remove tumor. Or it may be done after surgery to help keep the cancer from coming back.  Medicine inside your bladder  Intravesical therapy is often done in a healthcare providers office or outpatient clinic. A flexible tube (catheter) is passed through the urethra and into the bladder. The catheter is used to fill the bladder with a liquid medicine. This may be a liquid chemotherapy medicine. It kills cancer cells. Or it may be BCG (Bacillus of Calmette and Kym). This is a type of bacteria that helps boost your bodys immune system so that it kills the cancer cells.  During treatment  You will need to hold the medicine in your bladder for 2 hours. In some cases, the catheter may be left in and the medicine is removed through it when treatment is done. In other cases, the catheter is taken out after the medicine is put in. Then you will urinate after the 2 hours are over. If BCG is used, you may need to pour bleach in the toilet after you urinate. This kills bacteria that may be left over. Intravesical therapy is given weekly for 6 to 8 weeks. During this time, you may be given antibiotics. This is to help prevent infection. It will depend on the medicine that is used for you.   After treatment  After your initial BCG treatment, you may need to have follow-up treatments for up to a year  or more. This is called maintenance BCG. These help keep the cancer from coming back. When all the treatments are done, you may have tests done every few months to help check for cancer cells.  Risks and possible complications  Be aware of the following:  · Bladder infection  · Blood in the urine  · Bladder irritation (burning, need to urinate often, pain on urination)  · Changes in your blood cell counts (with certain chemotherapy medicines)  · Scarring of the bladder (rare)  · General infection (with BCG) (very rare)   Call your healthcare provider right away if you develop a fever of 100.4°F (38°C) or higher. Be sure you know what other problems you should watch for, and know how to get help any time, including after office hours, on weekends, and on holidays.    Date Last Reviewed: 2/27/2016 © 2000-2017 [employer]. [address] [place] [address]. All rights reserved. This information is not intended as a substitute for professional medical care. Always follow your healthcare professional's instructions.        Gemcitabine injection  What is this medicine?  GEMCITABINE (roger SIT a been) is a chemotherapy drug. This medicine is used to treat many types of cancer like breast cancer, lung cancer, pancreatic cancer, and ovarian cancer.  How should I use this medicine?  This drug is given as an infusion into a vein. It is administered in a hospital or clinic by a specially trained health care professional.  Talk to your pediatrician regarding the use of this medicine in children. Special care may be needed.  What side effects may I notice from receiving this medicine?  Side effects that you should report to your doctor or health care professional as soon as possible:  · allergic reactions like skin rash, itching or hives, swelling of the face, lips, or tongue  · low blood counts - this medicine may decrease the number of white blood cells, red blood cells and platelets. You may be at  increased risk for infections and bleeding.  · signs of infection - fever or chills, cough, sore throat, pain or difficulty passing urine  · signs of decreased platelets or bleeding - bruising, pinpoint red spots on the skin, black, tarry stools, blood in the urine  · signs of decreased red blood cells - unusually weak or tired, fainting spells, lightheadedness  · breathing problems  · chest pain  · mouth sores  · nausea and vomiting  · pain, swelling, redness at site where injected  · pain, tingling, numbness in the hands or feet  · stomach pain  · swelling of ankles, feet, hands  · unusual bleeding  Side effects that usually do not require medical attention (report to your doctor or health care professional if they continue or are bothersome):  · constipation  · diarrhea  · hair loss  · loss of appetite  · stomach upset  What may interact with this medicine?  · medicines to increase blood counts like filgrastim, pegfilgrastim, sargramostim  · some other chemotherapy drugs like cisplatin  · vaccines  Talk to your doctor or health care professional before taking any of these medicines:  · acetaminophen  · aspirin  · ibuprofen  · ketoprofen  · naproxen  What if I miss a dose?  It is important not to miss your dose. Call your doctor or health care professional if you are unable to keep an appointment.  Where should I keep my medicine?  This drug is given in a hospital or clinic and will not be stored at home.  What should I tell my health care provider before I take this medicine?  They need to know if you have any of these conditions:  · blood disorders  · infection  · kidney disease  · liver disease  · recent or ongoing radiation therapy  · an unusual or allergic reaction to gemcitabine, other chemotherapy, other medicines, foods, dyes, or preservatives  · pregnant or trying to get pregnant  · breast-feeding  What should I watch for while using this medicine?  Visit your doctor for checks on your progress. This drug  may make you feel generally unwell. This is not uncommon, as chemotherapy can affect healthy cells as well as cancer cells. Report any side effects. Continue your course of treatment even though you feel ill unless your doctor tells you to stop.  In some cases, you may be given additional medicines to help with side effects. Follow all directions for their use.  Call your doctor or health care professional for advice if you get a fever, chills or sore throat, or other symptoms of a cold or flu. Do not treat yourself. This drug decreases your body's ability to fight infections. Try to avoid being around people who are sick.  This medicine may increase your risk to bruise or bleed. Call your doctor or health care professional if you notice any unusual bleeding.  Be careful brushing and flossing your teeth or using a toothpick because you may get an infection or bleed more easily. If you have any dental work done, tell your dentist you are receiving this medicine.  Avoid taking products that contain aspirin, acetaminophen, ibuprofen, naproxen, or ketoprofen unless instructed by your doctor. These medicines may hide a fever.  Women should inform their doctor if they wish to become pregnant or think they might be pregnant. There is a potential for serious side effects to an unborn child. Talk to your health care professional or pharmacist for more information. Do not breast-feed an infant while taking this medicine.  NOTE:This sheet is a summary. It may not cover all possible information. If you have questions about this medicine, talk to your doctor, pharmacist, or health care provider. Copyright© 2017 [employer]

## 2018-09-04 NOTE — LETTER
September 4, 2018      Ernesto Duckworth MD  [address]  [facility] [address]           [facility]  [address]  [facility] [address]  Phone: [phone]          Patient: Connor Marie   MR Number: [id number]   YOB: 1940   Date of Visit: 9/4/2018       Dear Dr. Ernesto Duckworth:    Thank you for referring Connor Marie to me for evaluation. Attached you will find relevant portions of my assessment and plan of care.    If you have questions, please do not hesitate to call me. I look forward to following Connor Marie along with you.    Sincerely,    Julia Brennan, BJ    Enclosure  CC:  No Recipients    If you would like to receive this communication electronically, please contact [email or web address] or [phone] to request more information on [employer] access.    For providers and/or their staff who would like to refer a patient to [facility], please contact us through our one-stop-shop provider referral line, [facility], at [phone].    If you feel you have received this communication in error or would no longer like to receive these types of communications, please e-mail [email or web address]

## 2018-09-04 NOTE — PROGRESS NOTES
Subjective:       Patient ID: Connor Marie is a 77 y.o. male.    Chief Complaint: Bladder Cancer (Gemcitabine 4 of 6)    Connor Marie is a 77 y.o. White male who returns today in follow-up for management of his recurrent non-muscle invasive bladder cancer.     The patient returns today to continue a course of induction intravesical gemcitabine.  He returns today for 4/6 instillation.    The patient tolerated last week's (08/28/2018) instillation well.  He uses a [place] Clamp to help with with incontinence    He is doing well without complaints.        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:     9/2014: Right nephroureterectomy for high-grade Ta UTUC  7/2015: High-grade Ta bladder cancer  11/2015: induction course #1 (6/6) BCG  Early 2016: maintenance course of BCG (3/3)  4/2016: PUNLMP  6-7/2016: induction course of BCG (4/6)  7/2016: low-grade Ta bladder cancer  10/2016: multifocal HGTa bladder cancer  1/2017: HGTa bladder cancer  5/2017: LGTa bladder cancer  6/2017: HGTa bladder cancer  7-8/2017: repeat induction BCG  8/2017-3/2018: monthly BCG  4/2018: recurrent HGTa disease  7/2018: recurrent, multifocal high-grade urothelial cancer, now involving the penile urethra  7/2018 (by me): multifocal low-grade Ta bladder cancer     8/2016-3/2017: Patient has received BCG every month once a month for eight consecutive months.     The patient has a significant history of smoking, having quit in 2014.           Review of Systems   Constitutional: Negative for activity change, appetite change, chills and fever.   HENT: Negative for facial swelling and trouble swallowing.    Eyes: Negative for visual disturbance.   Respiratory: Negative for chest tightness and shortness of breath.    Cardiovascular: Negative for chest pain and palpitations.   Gastrointestinal: Negative.  Negative for abdominal pain, constipation, diarrhea, nausea and vomiting.    Genitourinary: Positive for frequency and nocturia. Negative for difficulty urinating, dysuria, flank pain, hematuria, penile pain, penile swelling, scrotal swelling and testicular pain.        Ok with urination  No hematuria.     Musculoskeletal: Negative for back pain, gait problem, myalgias and neck stiffness.   Skin: Negative for rash.   Neurological: Negative for dizziness and speech difficulty.   Hematological: Does not bruise/bleed easily.   Psychiatric/Behavioral: Negative for behavioral problems.       Objective:      Physical Exam   Nursing note and vitals reviewed.  Constitutional: He is oriented to person, place, and time. He appears well-developed and well-nourished.  Non-toxic appearance. He does not have a sickly appearance.   Urine dipped clear of infection in the office today.     HENT:   Head: Normocephalic and atraumatic.   Right Ear: External ear normal.   Left Ear: External ear normal.   Nose: Nose normal.   Mouth/Throat: Mucous membranes are normal.   Eyes: Conjunctivae and lids are normal. No scleral icterus.   Neck: Trachea normal, normal range of motion and full passive range of motion without pain. Neck supple. No JVD present. No tracheal deviation present.   Cardiovascular: Normal rate, S1 normal and S2 normal.    Pulmonary/Chest: Effort normal. No respiratory distress. He exhibits no tenderness.   Abdominal: Soft. Normal appearance and bowel sounds are normal. There is no hepatosplenomegaly. There is no tenderness. There is no CVA tenderness.   Genitourinary: Testes normal and penis normal. Right testis shows no swelling and no tenderness. Left testis shows no swelling and no tenderness.   Musculoskeletal: Normal range of motion.   Neurological: He is alert and oriented to person, place, and time. He has normal strength.   Skin: Skin is warm, dry and intact.     Psychiatric: He has a normal mood and affect. His behavior is normal. Judgment and thought content normal.       Assessment:        1. Malignant neoplasm of overlapping sites of bladder      Gemcitabine 4 of 6    Plan:         I spent 25 minutes with the patient of which more than half was spent in direct consultation with the patient in regards to our treatment and plan.    Education and recommendations of today's plan of care including home remedies.  Gemcitabine dose 4 of 6 was instilled today using sterile and Gemcitabine precautions.  He tolerated well  Walter clamp placed.  Discussed post installation expectations.   We discussed preparations before coming in (no bladder irritants such as coffee.   RTC one week

## 2018-09-10 ENCOUNTER — OFFICE VISIT (OUTPATIENT)
Dept: UROLOGY | Facility: CLINIC | Age: 78
End: 2018-09-10
Payer: MEDICARE

## 2018-09-10 VITALS
HEART RATE: 84 BPM | BODY MASS INDEX: 28.7 KG/M2 | HEIGHT: 71 IN | WEIGHT: 205 LBS | DIASTOLIC BLOOD PRESSURE: 67 MMHG | SYSTOLIC BLOOD PRESSURE: 142 MMHG

## 2018-09-10 DIAGNOSIS — C68.0 URETHRAL CANCER: ICD-10-CM

## 2018-09-10 DIAGNOSIS — C67.8 MALIGNANT NEOPLASM OF OVERLAPPING SITES OF BLADDER: Primary | ICD-10-CM

## 2018-09-10 DIAGNOSIS — N18.30 CKD (CHRONIC KIDNEY DISEASE), STAGE III: ICD-10-CM

## 2018-09-10 PROCEDURE — 51720 TREATMENT OF BLADDER LESION: CPT | Mod: PBBFAC | Performed by: UROLOGY

## 2018-09-10 PROCEDURE — 99213 OFFICE O/P EST LOW 20 MIN: CPT | Mod: S$PBB,25,, | Performed by: UROLOGY

## 2018-09-10 PROCEDURE — 99999 PR PBB SHADOW E&M-EST. PATIENT-LVL III: CPT | Mod: PBBFAC,,, | Performed by: UROLOGY

## 2018-09-10 PROCEDURE — 96372 THER/PROPH/DIAG INJ SC/IM: CPT | Mod: PBBFAC

## 2018-09-10 PROCEDURE — 51720 TREATMENT OF BLADDER LESION: CPT | Mod: S$PBB,,, | Performed by: UROLOGY

## 2018-09-10 PROCEDURE — 99213 OFFICE O/P EST LOW 20 MIN: CPT | Mod: PBBFAC | Performed by: UROLOGY

## 2018-09-10 RX ADMIN — GEMCITABINE HYDROCHLORIDE: 1 INJECTION, POWDER, LYOPHILIZED, FOR SOLUTION INTRAVENOUS at 12:09

## 2018-09-10 NOTE — PROGRESS NOTES
"Subjective:       Patient ID: Connor Marie is a 77 y.o. male.    Chief Complaint: No chief complaint on file.      HPI: Connor Marie is a 77 y.o. White male who returns today in follow-up for management of his recurrent non-muscle invasive bladder cancer.    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:    9/2014: Right nephroureterectomy for high-grade Ta UTUC  7/2015: High-grade Ta bladder cancer  11/2015: induction course #1 (6/6) BCG  Early 2016: maintenance course of BCG (3/3)  4/2016: PUNLMP  6-7/2016: induction course of BCG (4/6)  7/2016: low-grade Ta bladder cancer  10/2016: multifocal HGTa bladder cancer  1/2017: HGTa bladder cancer  5/2017: LGTa bladder cancer  6/2017: HGTa bladder cancer  7-8/2017: repeat induction BCG  8/2017-3/2018: monthly BCG  4/2018: recurrent HGTa disease  7/2018: recurrent, multifocal high-grade urothelial cancer, now involving the penile urethra  7/2018 (by me): multifocal low-grade Ta bladder cancer    8/2016-3/2017: Patient has received BCG every month once a month for eight consecutive months.    The patient has a significant history of smoking, having quit in 2014.    The patient returns today to continue a course of induction intravesical gemcitabine.    The patient tolerated last week's instillation well.    He returns today for 5/6 instillation.    He is doing well without complaints.    Review of patient's allergies indicates:   Allergen Reactions    Penicillins      [employer] "H[employer] A MILD REACTION"  "Unsure if I am still allergic but I do not want to take the chance."       Current Outpatient Medications   Medication Sig Dispense Refill    albuterol (PROVENTIL) 2.5 mg /3 mL (0.083 %) nebulizer solution Take 2.5 mg by nebulization every 6 (six) hours as needed for Wheezing. Rescue      docusate sodium (COLACE) 100 MG capsule Take 1 capsule (100 mg total) by mouth 2 (two) times daily.  0    " fluticasone-vilanterol (BREO) 100-25 mcg/dose diskus inhaler Inhale 1 puff into the lungs.      oxybutynin (DITROPAN) 5 MG Tab Take 1 tablet (5 mg total) by mouth 3 (three) times daily. 30 tablet 1    SITagliptan (JANUVIA) 25 MG Tab Take 25 mg by mouth.      tamsulosin (FLOMAX) 0.4 mg Cp24 Take 0.4 mg by mouth once daily.   0     Current Facility-Administered Medications   Medication Dose Route Frequency Provider Last Rate Last Dose    sodium chloride 0.9% 100 mL with gemcitabine (GEMZAR) 2,000 mg   Intravesical 1 time in Clinic/HOD Ernesto Duckworth MD        sodium chloride 0.9% 100 mL with gemcitabine (GEMZAR) 2,000 mg  100 mL Intravesical 1 time in Clinic/HOD Ernesto Duckworth MD           Past Medical History:   Diagnosis Date    Allergy     Bladder cancer     COPD (chronic obstructive pulmonary disease)     Diabetes mellitus     Disorder of kidney and ureter     Kidney mass     Mass of upper lobe of right lung 7/13/2017    Panlobular emphysema 7/13/2017    SOB (shortness of breath)     Spontaneous pneumothorax 7/13/2017       Past Surgical History:   Procedure Laterality Date    BACK SURGERY      BLADDER SURGERY      cataracts      CHOLECYSTECTOMY      CYSTOSCOPY      NEPHRECTOMY      PROSTATECTOMY-TRANSURETHRAL; cysto bladder biopsy; left retrograde pyelogram Left 6/12/2017    [facility]    RESECTION,NEOPLASM,BLADDER,TRANSURETHRAL N/A 7/17/2018    [facility]    rt nephrectomy      TUMOR-BLADDER-TRANSURETHRAL RESECTION  6/12/2017    Per[facility] at [facility] OR    TURBT         Family History   Problem Relation Age of Onset    Colon cancer Mother     Stroke Father     Diabetes Father     Brain cancer Sister     Scleroderma Sister     Leukemia Brother     Prostate cancer Brother        Review of Systems    Review of Systems   Constitutional: Negative for fever, chills, activity change,  appetite change and unexpected weight change.   HENT: Negative for congestion, nosebleeds, sneezing, sore throat and trouble swallowing.    Eyes: Negative for pain, discharge, redness and visual disturbance.   Respiratory: Negative for cough, choking, chest tightness and shortness of breath.    Cardiovascular: Negative for chest pain, palpitations and leg swelling.   Gastrointestinal: Negative for nausea, vomiting, abdominal pain, diarrhea, blood in stool, abdominal distention and anal bleeding.  Genitourinary: As documented per HPI   Endocrine: Negative for cold intolerance, heat intolerance, polydipsia, polyphagia and polyuria.   Musculoskeletal: Negative for myalgias, gait problem, neck pain and neck stiffness.   Skin: Negative for color change, pallor, rash and wound.   Allergic/Immunologic: Negative for immunocompromised state.   Neurological: Negative for seizures, facial asymmetry, speech difficulty, weakness and light-headedness.   Hematological: Negative for adenopathy. Does not bruise/bleed easily.   Psychiatric/Behavioral: Negative for hallucinations, behavioral problems, self-injury and agitation. The patient is not hyperactive.    All other systems were reviewed and were negative.      Objective:     Vitals:    09/10/18 0854   BP: (!) 142/67   Pulse: 84     Physical Exam   Vitals reviewed.  Constitutional: He is oriented to person, place, and time. He appears well-developed and well-nourished. No distress.   HENT:   Head: Normocephalic and atraumatic.   Right Ear: External ear normal.   Left Ear: External ear normal.   Nose: Nose normal.   Eyes: EOM are normal. Pupils are equal, round, and reactive to light. Right eye exhibits no discharge. Left eye exhibits no discharge.   Neck: Normal range of motion. Neck supple. No tracheal deviation present. No thyroid enlargement or tenderness.  Cardiovascular: Regular rhythm and intact distal pulses. No signs of peripheral edema.   Pulmonary/Chest: Effort normal  and breath sounds normal. No stridor. No respiratory distress. He has no wheezes.   Abdominal: Soft. Bowel sounds are normal. He exhibits no distension. There is no tenderness. Hernia confirmed negative in the right inguinal area and confirmed negative in the left inguinal area. No hepatic or splenic enlargement. Healed nephroureterectomy scars.  Genitourinary: Penis normal. Right testis shows no mass, no swelling and no tenderness. Right testis is descended. Left testis shows no mass, no swelling and no tenderness. Left testis is descended. Circumcised. No phimosis or hypospadias.   SHANNON: Deferred.  Musculoskeletal: Normal range of motion. He exhibits no edema.   Neurological: He is alert and oriented to person, place, and time. He exhibits normal muscle tone. Coordination normal.   Skin: Skin is warm. No rash noted.   Lymphatic: No palpable lymph nodes.  Psychiatric: He has a normal mood and affect. His behavior is normal. Judgment and thought content normal.    The patient's genitalia was prepped and draped in the usual sterile fashion. A Rodgers catheter was inserted, and the bladder was drained. Intravesical gemcitabine was then administered intravesically via the Rodgers catheter. The Rodgers was removed, and the patient was instructed to hold the gemcitabine intravesically for as long as possible, not greater than 2 hours.    No results found for: PSA  Lab Results   Component Value Date    CREATININE 2.4 (H) 07/09/2018     Lab Results   Component Value Date    EGFRNONAA 25.1 (A) 07/09/2018     Lab Results   Component Value Date    ESTGFRAFRICA 29.0 (A) 07/09/2018     I personally reviewed all the patient's films.    Assessment:       1. Malignant neoplasm of overlapping sites of bladder    2. CKD (chronic kidney disease), stage III    3. Urethral cancer        Plan:     Diagnoses and all orders for this visit:    Malignant neoplasm of overlapping sites of bladder  -     Cancel: NURSING COMMUNICATION; Standing  -      Cancel: NURSING COMMUNICATION; Standing  -     NURSING COMMUNICATION; Standing  -     sodium chloride 0.9% 100 mL with gemcitabine (GEMZAR) 2,000 mg; by Intravesical route one time.  -     NURSING COMMUNICATION    CKD (chronic kidney disease), stage III    Urethral cancer    Other orders  -     NON FORMULARY MEDICATION 2,000 mg; 2,000 mg by Intravesical route one time.    The patient returns today to continue induction gemcitabine for his recurrent NMIBC.     I had a lengthy discussion again with the patient regarding implications of her diagnosis of non muscle-invasive urothelial carcinoma of the bladder, i.e, bladder cancer.     I explained that individuals with non-muscle invasive bladder cancer account for 75-80% of those with bladder cancer.  Nearly all of such patients exhibit urothelial carcinoma histology (previously known as TCC).  Management of patients with this condition focuses on prevention of disease progression, since effectiveness of salvage therapies for patients with systemic urothelial carcinoma is currently extremely limited.  Indeed, we discussed that approximately 15-25% of all-comers with non-muscle invasive disease will progress to muscle-invasive pathology.  I explained that muscle-invasion is considered the gateway to metastatic progression; however, a small but real percentage of patients can progress to systemic disease without ever demonstrating evidence of muscle-invasive pathology.     The patient tolerated today's instillation well.     He will return next week for his sixth and final instillation.     I answered all his questions.     I encouraged him or any of his family members to call or email me with questions and/or concerns.     I spent 20 minutes with the patient of which more than half was spent in coordinating the patient's care as well as in direct consultation with the patient in regards to our treatment and plan.

## 2018-09-10 NOTE — PATIENT INSTRUCTIONS
Understanding Bladder Cancer    The urinary system includes the kidneys, ureters, bladder, and urethra. It makes, stores, and gets rid of liquid waste called urine. The two kidneys filter blood to collect waste and make urine. The ureters are small tubes that carry urine from each kidney to the bladder. The bladder is where urine is stored before it leaves the body. The urethra is the tube urine goes through to leave the body.   Bladder cancer means that certain cells in the bladder have changed in ways that aren't normal.  When bladder cancer forms  Cancer is a disease that causes cells to change and multiply out of control. The multiplying cells may form a lump of tissue (tumor). With time, the cancer cells destroy healthy tissue. They may spread to other parts of the body. Why some cells become cancerous is not always clear. But bladder cancer is strongly linked to cigarette smoking. The longer a person smokes and the more a person smokes, the greater that persons chances of developing bladder cancer.  Types of cancer that may form  Bladder cancer may grow in different ways:  · Papillary tumors stick out from the bladder lining on a stalk. They tend to grow into the bladder cavity, away from the bladder wall, instead of deeper into the layers of the bladder wall.  · Flat tumors do not stick out from the bladder lining. These tumors are much more likely than papillary tumors to grow deeper into the layers of the bladder wall.  · Carcinoma in situ (CIS) is a cancerous patch of cells that is only in the inner layer of the bladder lining and has not spread to deeper tissue. The patch may look almost normal or may look inflamed.    Each type of tumor can be present in one or more areas of the bladder, and more than one type can be present at the same time.  Date Last Reviewed: 2/17/2016  © 4648-7250 [employer]. [address] [place] [address], [place] [address]. All rights reserved. This information is not  intended as a substitute for professional medical care. Always follow your healthcare professional's instructions.

## 2018-09-17 ENCOUNTER — OFFICE VISIT (OUTPATIENT)
Dept: UROLOGY | Facility: CLINIC | Age: 78
End: 2018-09-17
Payer: MEDICARE

## 2018-09-17 VITALS
RESPIRATION RATE: 185 BRPM | WEIGHT: 202 LBS | BODY MASS INDEX: 28.28 KG/M2 | HEART RATE: 82 BPM | HEIGHT: 71 IN | DIASTOLIC BLOOD PRESSURE: 71 MMHG | SYSTOLIC BLOOD PRESSURE: 136 MMHG

## 2018-09-17 DIAGNOSIS — C67.8 MALIGNANT NEOPLASM OF OVERLAPPING SITES OF BLADDER: Primary | ICD-10-CM

## 2018-09-17 DIAGNOSIS — C68.0 URETHRAL CANCER: ICD-10-CM

## 2018-09-17 DIAGNOSIS — N18.30 CKD (CHRONIC KIDNEY DISEASE), STAGE III: ICD-10-CM

## 2018-09-17 PROCEDURE — 99213 OFFICE O/P EST LOW 20 MIN: CPT | Mod: PBBFAC | Performed by: UROLOGY

## 2018-09-17 PROCEDURE — 99213 OFFICE O/P EST LOW 20 MIN: CPT | Mod: S$PBB,25,, | Performed by: UROLOGY

## 2018-09-17 PROCEDURE — 51720 TREATMENT OF BLADDER LESION: CPT | Mod: S$PBB,,, | Performed by: UROLOGY

## 2018-09-17 PROCEDURE — 99999 PR PBB SHADOW E&M-EST. PATIENT-LVL III: CPT | Mod: PBBFAC,,, | Performed by: UROLOGY

## 2018-09-17 PROCEDURE — 96372 THER/PROPH/DIAG INJ SC/IM: CPT | Mod: PBBFAC

## 2018-09-17 PROCEDURE — 51720 TREATMENT OF BLADDER LESION: CPT | Mod: PBBFAC | Performed by: UROLOGY

## 2018-09-17 RX ADMIN — GEMCITABINE HYDROCHLORIDE: 1 INJECTION, POWDER, LYOPHILIZED, FOR SOLUTION INTRAVENOUS at 11:09

## 2018-09-17 NOTE — PROGRESS NOTES
"Subjective:       Patient ID: Connor Marie is a 77 y.o. male.    Chief Complaint: Malignant neoplasm of overlapping sites of bladder (Gemcitabine 6 of 6)      HPI: Connor Marie is a 77 y.o. White male who returns today in follow-up for management of his recurrent non-muscle invasive bladder cancer.    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:    9/2014: Right nephroureterectomy for high-grade Ta UTUC  7/2015: High-grade Ta bladder cancer  11/2015: induction course #1 (6/6) BCG  Early 2016: maintenance course of BCG (3/3)  4/2016: PUNLMP  6-7/2016: induction course of BCG (4/6)  7/2016: low-grade Ta bladder cancer  10/2016: multifocal HGTa bladder cancer  1/2017: HGTa bladder cancer  5/2017: LGTa bladder cancer  6/2017: HGTa bladder cancer  7-8/2017: repeat induction BCG  8/2017-3/2018: monthly BCG  4/2018: recurrent HGTa disease  7/2018: recurrent, multifocal high-grade urothelial cancer, now involving the penile urethra  7/2018 (by me): multifocal low-grade Ta bladder cancer    8/2016-3/2017: Patient has received BCG every month once a month for eight consecutive months.    The patient has a significant history of smoking, having quit in 2014.    The patient returns today to continue a course of induction intravesical gemcitabine.    The patient tolerated last week's instillation well.    He returns today for 6/6 instillation.    He is doing well without complaints.    Review of patient's allergies indicates:   Allergen Reactions    Penicillins      [employer] "H[employer] A MILD REACTION"  "Unsure if I am still allergic but I do not want to take the chance."       Current Outpatient Medications   Medication Sig Dispense Refill    albuterol (PROVENTIL) 2.5 mg /3 mL (0.083 %) nebulizer solution Take 2.5 mg by nebulization every 6 (six) hours as needed for Wheezing. Rescue      fluticasone-vilanterol (BREO) 100-25 mcg/dose diskus inhaler Inhale 1 puff " into the lungs.      oxybutynin (DITROPAN) 5 MG Tab Take 1 tablet (5 mg total) by mouth 3 (three) times daily. 30 tablet 1    SITagliptan (JANUVIA) 25 MG Tab Take 25 mg by mouth.      tamsulosin (FLOMAX) 0.4 mg Cp24 Take 0.4 mg by mouth once daily.   0    docusate sodium (COLACE) 100 MG capsule Take 1 capsule (100 mg total) by mouth 2 (two) times daily.  0     Current Facility-Administered Medications   Medication Dose Route Frequency Provider Last Rate Last Dose    sodium chloride 0.9% 100 mL with gemcitabine (GEMZAR) 2,000 mg   Intravesical 1 time in Clinic/HOD Ernesto Duckworth MD           Past Medical History:   Diagnosis Date    Allergy     Bladder cancer     COPD (chronic obstructive pulmonary disease)     Diabetes mellitus     Disorder of kidney and ureter     Kidney mass     Mass of upper lobe of right lung 7/13/2017    Panlobular emphysema 7/13/2017    SOB (shortness of breath)     Spontaneous pneumothorax 7/13/2017       Past Surgical History:   Procedure Laterality Date    BACK SURGERY      BLADDER SURGERY      cataracts      CHOLECYSTECTOMY      CYSTOSCOPY      NEPHRECTOMY      PROSTATECTOMY-TRANSURETHRAL; cysto bladder biopsy; left retrograde pyelogram Left 6/12/2017    Performed by [facility] at [facility] OR    RESECTION,NEOPLASM,BLADDER,TRANSURETHRAL N/A 7/17/2018    [facility]    rt nephrectomy      TUMOR-BLADDER-TRANSURETHRAL RESECTION  6/12/2017    Per[facility] by [facility]as [facility] at [facility] OR    TURBT         Family History   Problem Relation Age of Onset    Colon cancer Mother     Stroke Father     Diabetes Father     Brain cancer Sister     Scleroderma Sister     Leukemia Brother     Prostate cancer Brother        Review of Systems    Review of Systems   Constitutional: Negative for fever, chills, activity change, appetite change and unexpected weight change.   HENT: Negative for congestion, nosebleeds, sneezing, sore  throat and trouble swallowing.    Eyes: Negative for pain, discharge, redness and visual disturbance.   Respiratory: Negative for cough, choking, chest tightness and shortness of breath.    Cardiovascular: Negative for chest pain, palpitations and leg swelling.   Gastrointestinal: Negative for nausea, vomiting, abdominal pain, diarrhea, blood in stool, abdominal distention and anal bleeding.  Genitourinary: As documented per HPI   Endocrine: Negative for cold intolerance, heat intolerance, polydipsia, polyphagia and polyuria.   Musculoskeletal: Negative for myalgias, gait problem, neck pain and neck stiffness.   Skin: Negative for color change, pallor, rash and wound.   Allergic/Immunologic: Negative for immunocompromised state.   Neurological: Negative for seizures, facial asymmetry, speech difficulty, weakness and light-headedness.   Hematological: Negative for adenopathy. Does not bruise/bleed easily.   Psychiatric/Behavioral: Negative for hallucinations, behavioral problems, self-injury and agitation. The patient is not hyperactive.    All other systems were reviewed and were negative.      Objective:     Vitals:    09/17/18 0901   BP: 136/71   Pulse: 82   Resp: (!) 185     Physical Exam   Vitals reviewed.  Constitutional: He is oriented to person, place, and time. He appears well-developed and well-nourished. No distress.   HENT:   Head: Normocephalic and atraumatic.   Right Ear: External ear normal.   Left Ear: External ear normal.   Nose: Nose normal.   Eyes: EOM are normal. Pupils are equal, round, and reactive to light. Right eye exhibits no discharge. Left eye exhibits no discharge.   Neck: Normal range of motion. Neck supple. No tracheal deviation present. No thyroid enlargement or tenderness.  Cardiovascular: Regular rhythm and intact distal pulses. No signs of peripheral edema.   Pulmonary/Chest: Effort normal and breath sounds normal. No stridor. No respiratory distress. He has no wheezes.   Abdominal:  Soft. Bowel sounds are normal. He exhibits no distension. There is no tenderness. Hernia confirmed negative in the right inguinal area and confirmed negative in the left inguinal area. No hepatic or splenic enlargement. Healed nephroureterectomy scars.  Genitourinary: Penis normal. Right testis shows no mass, no swelling and no tenderness. Right testis is descended. Left testis shows no mass, no swelling and no tenderness. Left testis is descended. Circumcised. No phimosis or hypospadias.   SHANNON: Deferred.  Musculoskeletal: Normal range of motion. He exhibits no edema.   Neurological: He is alert and oriented to person, place, and time. He exhibits normal muscle tone. Coordination normal.   Skin: Skin is warm. No rash noted.   Lymphatic: No palpable lymph nodes.  Psychiatric: He has a normal mood and affect. His behavior is normal. Judgment and thought content normal.    The patient's genitalia was prepped and draped in the usual sterile fashion. A Rodgers catheter was inserted, and the bladder was drained. Intravesical gemcitabine was then administered intravesically via the Rodgers catheter. The Rogders was removed, and the patient was instructed to hold the gemcitabine intravesically for as long as possible, not greater than 2 hours.    No results found for: PSA  Lab Results   Component Value Date    CREATININE 2.4 (H) 07/09/2018     Lab Results   Component Value Date    EGFRNONAA 25.1 (A) 07/09/2018     Lab Results   Component Value Date    ESTGFRAFRICA 29.0 (A) 07/09/2018     I personally reviewed all the patient's films.    Assessment:       1. Malignant neoplasm of overlapping sites of bladder    2. CKD (chronic kidney disease), stage III    3. Urethral cancer        Plan:     Connor was seen today for malignant neoplasm of overlapping sites of bladder.    Diagnoses and all orders for this visit:    Malignant neoplasm of overlapping sites of bladder    CKD (chronic kidney disease), stage III    Urethral  cancer    The patient returns today to continue induction gemcitabine for his recurrent NMIBC.     I had a lengthy discussion again with the patient regarding implications of her diagnosis of non muscle-invasive urothelial carcinoma of the bladder, i.e, bladder cancer.     I explained that individuals with non-muscle invasive bladder cancer account for 75-80% of those with bladder cancer.  Nearly all of such patients exhibit urothelial carcinoma histology (previously known as TCC).  Management of patients with this condition focuses on prevention of disease progression, since effectiveness of salvage therapies for patients with systemic urothelial carcinoma is currently extremely limited.  Indeed, we discussed that approximately 15-25% of all-comers with non-muscle invasive disease will progress to muscle-invasive pathology.  I explained that muscle-invasion is considered the gateway to metastatic progression; however, a small but real percentage of patients can progress to systemic disease without ever demonstrating evidence of muscle-invasive pathology.     The patient tolerated today's instillation well.     He has finished his induction course and will return next month to begin his maintenance course.     I answered all his questions.     I encouraged him or any of his family members to call or email me with questions and/or concerns.     I spent 20 minutes with the patient of which more than half was spent in coordinating the patient's care as well as in direct consultation with the patient in regards to our treatment and plan.

## 2018-09-17 NOTE — PATIENT INSTRUCTIONS
Understanding Bladder Cancer    The urinary system includes the kidneys, ureters, bladder, and urethra. It makes, stores, and gets rid of liquid waste called urine. The two kidneys filter blood to collect waste and make urine. The ureters are small tubes that carry urine from each kidney to the bladder. The bladder is where urine is stored before it leaves the body. The urethra is the tube urine goes through to leave the body.   Bladder cancer means that certain cells in the bladder have changed in ways that aren't normal.  When bladder cancer forms  Cancer is a disease that causes cells to change and multiply out of control. The multiplying cells may form a lump of tissue (tumor). With time, the cancer cells destroy healthy tissue. They may spread to other parts of the body. Why some cells become cancerous is not always clear. But bladder cancer is strongly linked to cigarette smoking. The longer a person smokes and the more a person smokes, the greater that persons chances of developing bladder cancer.  Types of cancer that may form  Bladder cancer may grow in different ways:  · Papillary tumors stick out from the bladder lining on a stalk. They tend to grow into the bladder cavity, away from the bladder wall, instead of deeper into the layers of the bladder wall.  · Flat tumors do not stick out from the bladder lining. These tumors are much more likely than papillary tumors to grow deeper into the layers of the bladder wall.  · Carcinoma in situ (CIS) is a cancerous patch of cells that is only in the inner layer of the bladder lining and has not spread to deeper tissue. The patch may look almost normal or may look inflamed.    Each type of tumor can be present in one or more areas of the bladder, and more than one type can be present at the same time.  Date Last Reviewed: 2/17/2016  © 0491-8903 [employer]. [address] [place] [address], [place] [address]. All rights reserved. This information is not  intended as a substitute for professional medical care. Always follow your healthcare professional's instructions.

## 2018-10-17 ENCOUNTER — OFFICE VISIT (OUTPATIENT)
Dept: UROLOGY | Facility: CLINIC | Age: 78
End: 2018-10-17
Payer: MEDICARE

## 2018-10-17 VITALS
SYSTOLIC BLOOD PRESSURE: 132 MMHG | BODY MASS INDEX: 28 KG/M2 | RESPIRATION RATE: 15 BRPM | HEIGHT: 71 IN | WEIGHT: 200 LBS | DIASTOLIC BLOOD PRESSURE: 61 MMHG | HEART RATE: 82 BPM

## 2018-10-17 DIAGNOSIS — C67.8 MALIGNANT NEOPLASM OF OVERLAPPING SITES OF BLADDER: Primary | ICD-10-CM

## 2018-10-17 DIAGNOSIS — N18.30 CKD (CHRONIC KIDNEY DISEASE), STAGE III: ICD-10-CM

## 2018-10-17 DIAGNOSIS — C68.0 URETHRAL CANCER: ICD-10-CM

## 2018-10-17 PROCEDURE — 96372 THER/PROPH/DIAG INJ SC/IM: CPT | Mod: PBBFAC

## 2018-10-17 PROCEDURE — 99213 OFFICE O/P EST LOW 20 MIN: CPT | Mod: S$PBB,25,, | Performed by: UROLOGY

## 2018-10-17 PROCEDURE — 99999 PR PBB SHADOW E&M-EST. PATIENT-LVL III: CPT | Mod: PBBFAC,,, | Performed by: UROLOGY

## 2018-10-17 PROCEDURE — 51720 TREATMENT OF BLADDER LESION: CPT | Mod: S$PBB,,, | Performed by: UROLOGY

## 2018-10-17 PROCEDURE — 99213 OFFICE O/P EST LOW 20 MIN: CPT | Mod: PBBFAC | Performed by: UROLOGY

## 2018-10-17 PROCEDURE — 51720 TREATMENT OF BLADDER LESION: CPT | Mod: PBBFAC | Performed by: UROLOGY

## 2018-10-17 RX ADMIN — GEMCITABINE HYDROCHLORIDE: 1 INJECTION, POWDER, LYOPHILIZED, FOR SOLUTION INTRAVENOUS at 11:10

## 2018-10-17 NOTE — PATIENT INSTRUCTIONS
Understanding Bladder Cancer    The urinary system includes the kidneys, ureters, bladder, and urethra. It makes, stores, and gets rid of liquid waste called urine. The two kidneys filter blood to collect waste and make urine. The ureters are small tubes that carry urine from each kidney to the bladder. The bladder is where urine is stored before it leaves the body. The urethra is the tube urine goes through to leave the body.   Bladder cancer means that certain cells in the bladder have changed in ways that aren't normal.  When bladder cancer forms  Cancer is a disease that causes cells to change and multiply out of control. The multiplying cells may form a lump of tissue (tumor). With time, the cancer cells destroy healthy tissue. They may spread to other parts of the body. Why some cells become cancerous is not always clear. But bladder cancer is strongly linked to cigarette smoking. The longer a person smokes and the more a person smokes, the greater that persons chances of developing bladder cancer.  Types of cancer that may form  Bladder cancer may grow in different ways:  · Papillary tumors stick out from the bladder lining on a stalk. They tend to grow into the bladder cavity, away from the bladder wall, instead of deeper into the layers of the bladder wall.  · Flat tumors do not stick out from the bladder lining. These tumors are much more likely than papillary tumors to grow deeper into the layers of the bladder wall.  · Carcinoma in situ (CIS) is a cancerous patch of cells that is only in the inner layer of the bladder lining and has not spread to deeper tissue. The patch may look almost normal or may look inflamed.    Each type of tumor can be present in one or more areas of the bladder, and more than one type can be present at the same time.  Date Last Reviewed: 2/17/2016  © 3613-8069 [employer]. [address] [place] [address], [place] [address]. All rights reserved. This information is not  intended as a substitute for professional medical care. Always follow your healthcare professional's instructions.

## 2018-10-17 NOTE — PROGRESS NOTES
"Subjective:       Patient ID: Connor Marie is a 78 y.o. male.    Chief Complaint: Malignant neoplasm of overlapping sites of bladder (Gemcitabine)      HPI: Connor Marie is a 78 y.o. White male who returns today in follow-up for management of his recurrent non-muscle invasive bladder cancer.    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:    9/2014: Right nephroureterectomy for high-grade Ta UTUC  7/2015: High-grade Ta bladder cancer  11/2015: induction course #1 (6/6) BCG  Early 2016: maintenance course of BCG (3/3)  4/2016: PUNLMP  6-7/2016: induction course of BCG (4/6)  7/2016: low-grade Ta bladder cancer  10/2016: multifocal HGTa bladder cancer  1/2017: HGTa bladder cancer  5/2017: LGTa bladder cancer  6/2017: HGTa bladder cancer  7-8/2017: repeat induction BCG  8/2017-3/2018: monthly BCG  4/2018: recurrent HGTa disease  7/2018: recurrent, multifocal high-grade urothelial cancer, now involving the penile urethra  7/2018 (by me): multifocal low-grade Ta bladder cancer    8/2016-3/2017: Patient has received BCG every month once a month for eight consecutive months.    The patient has a significant history of smoking, having quit in 2014.    The patient returns today to begin a maintenance course of intravesical gemcitabine. He recently completed his induction course.    The patient tolerated last week's instillation well.    He returns today for 1/10 instillation.    He is doing well without complaints.    Review of patient's allergies indicates:   Allergen Reactions    Penicillins      [employer] "H[employer] A MILD REACTION"  "Unsure if I am still allergic but I do not want to take the chance."       Current Outpatient Medications   Medication Sig Dispense Refill    albuterol (PROVENTIL) 2.5 mg /3 mL (0.083 %) nebulizer solution Take 2.5 mg by nebulization every 6 (six) hours as needed for Wheezing. Rescue      docusate sodium (COLACE) 100 MG capsule " Take 1 capsule (100 mg total) by mouth 2 (two) times daily.  0    fluticasone-vilanterol (BREO) 100-25 mcg/dose diskus inhaler Inhale 1 puff into the lungs.      oxybutynin (DITROPAN) 5 MG Tab Take 1 tablet (5 mg total) by mouth 3 (three) times daily. 30 tablet 1    SITagliptan (JANUVIA) 25 MG Tab Take 25 mg by mouth.      tamsulosin (FLOMAX) 0.4 mg Cp24 Take 0.4 mg by mouth once daily.   0     Current Facility-Administered Medications   Medication Dose Route Frequency Provider Last Rate Last Dose    sodium chloride 0.9% 100 mL with gemcitabine (GEMZAR) 2,000 mg   Intravesical 1 time in Clinic/HOD Ernesto Duckworth MD           Past Medical History:   Diagnosis Date    Allergy     Bladder cancer     COPD (chronic obstructive pulmonary disease)     Diabetes mellitus     Disorder of kidney and ureter     Kidney mass     Mass of upper lobe of right lung 7/13/2017    Panlobular emphysema 7/13/2017    SOB (shortness of breath)     Spontaneous pneumothorax 7/13/2017       Past Surgical History:   Procedure Laterality Date    BACK SURGERY      BLADDER SURGERY      cataracts      CHOLECYSTECTOMY      CYSTOSCOPY      NEPHRECTOMY      PROSTATECTOMY-TRANSURETHRAL; cysto bladder biopsy; left retrograde pyelogram Left 6/12/2017    [facility]    RESECTION,NEOPLASM,BLADDER,TRANSURETHRAL N/A 7/17/2018    [facility]ST [facility]    rt nephrectomy      TUMOR-BLADDER-TRANSURETHRAL RESECTION  6/12/2017    [facility] OR    TURBT         Family History   Problem Relation Age of Onset    Colon cancer Mother     Stroke Father     Diabetes Father     Brain cancer Sister     Scleroderma Sister     Leukemia Brother     Prostate cancer Brother        Review of Systems    Review of Systems   Constitutional: Negative for fever, chills, activity change, appetite change and unexpected weight change.   HENT: Negative for  congestion, nosebleeds, sneezing, sore throat and trouble swallowing.    Eyes: Negative for pain, discharge, redness and visual disturbance.   Respiratory: Negative for cough, choking, chest tightness and shortness of breath.    Cardiovascular: Negative for chest pain, palpitations and leg swelling.   Gastrointestinal: Negative for nausea, vomiting, abdominal pain, diarrhea, blood in stool, abdominal distention and anal bleeding.  Genitourinary: As documented per HPI   Endocrine: Negative for cold intolerance, heat intolerance, polydipsia, polyphagia and polyuria.   Musculoskeletal: Negative for myalgias, gait problem, neck pain and neck stiffness.   Skin: Negative for color change, pallor, rash and wound.   Allergic/Immunologic: Negative for immunocompromised state.   Neurological: Negative for seizures, facial asymmetry, speech difficulty, weakness and light-headedness.   Hematological: Negative for adenopathy. Does not bruise/bleed easily.   Psychiatric/Behavioral: Negative for hallucinations, behavioral problems, self-injury and agitation. The patient is not hyperactive.    All other systems were reviewed and were negative.      Objective:     Vitals:    10/17/18 0908   BP: 132/61   Pulse: 82   Resp: 15     Physical Exam   Vitals reviewed.  Constitutional: He is oriented to person, place, and time. He appears well-developed and well-nourished. No distress.   HENT:   Head: Normocephalic and atraumatic.   Right Ear: External ear normal.   Left Ear: External ear normal.   Nose: Nose normal.   Eyes: EOM are normal. Pupils are equal, round, and reactive to light. Right eye exhibits no discharge. Left eye exhibits no discharge.   Neck: Normal range of motion. Neck supple. No tracheal deviation present. No thyroid enlargement or tenderness.  Cardiovascular: Regular rhythm and intact distal pulses. No signs of peripheral edema.   Pulmonary/Chest: Effort normal and breath sounds normal. No stridor. No respiratory  distress. He has no wheezes.   Abdominal: Soft. Bowel sounds are normal. He exhibits no distension. There is no tenderness. Hernia confirmed negative in the right inguinal area and confirmed negative in the left inguinal area. No hepatic or splenic enlargement. Healed nephroureterectomy scars.  Genitourinary: Penis normal. Right testis shows no mass, no swelling and no tenderness. Right testis is descended. Left testis shows no mass, no swelling and no tenderness. Left testis is descended. Circumcised. No phimosis or hypospadias.   SHANNON: Deferred.  Musculoskeletal: Normal range of motion. He exhibits no edema.   Neurological: He is alert and oriented to person, place, and time. He exhibits normal muscle tone. Coordination normal.   Skin: Skin is warm. No rash noted.   Lymphatic: No palpable lymph nodes.  Psychiatric: He has a normal mood and affect. His behavior is normal. Judgment and thought content normal.    The patient's genitalia was prepped and draped in the usual sterile fashion. A Rodgers catheter was inserted, and the bladder was drained. Intravesical gemcitabine was then administered intravesically via the Rodgers catheter. The Rodgers was removed, and the patient was instructed to hold the gemcitabine intravesically for as long as possible, not greater than 2 hours.    No results found for: PSA  Lab Results   Component Value Date    CREATININE 2.4 (H) 07/09/2018     Lab Results   Component Value Date    EGFRNONAA 25.1 (A) 07/09/2018     Lab Results   Component Value Date    ESTGFRAFRICA 29.0 (A) 07/09/2018     I personally reviewed all the patient's films.    Assessment:       1. Malignant neoplasm of overlapping sites of bladder    2. CKD (chronic kidney disease), stage III    3. Urethral cancer        Plan:     Connor was seen today for malignant neoplasm of overlapping sites of bladder.    Diagnoses and all orders for this visit:    Malignant neoplasm of overlapping sites of bladder  -     Cancel: NURSING  COMMUNICATION; Standing  -     NURSING COMMUNICATION; Standing  -     sodium chloride 0.9% 100 mL with gemcitabine (GEMZAR) 2,000 mg; by Intravesical route one time.  -     NURSING COMMUNICATION    CKD (chronic kidney disease), stage III    Urethral cancer    Other orders  -     Cancel: NON FORMULARY MEDICATION 2,000 mg; 2,000 mg by Intravesical route one time.    The patient returns today to begin a maintenance course of intravesical gemcitabine for his recurrent NMIBC.     I had a lengthy discussion again with the patient regarding implications of her diagnosis of non muscle-invasive urothelial carcinoma of the bladder, i.e, bladder cancer.     I explained that individuals with non-muscle invasive bladder cancer account for 75-80% of those with bladder cancer.  Nearly all of such patients exhibit urothelial carcinoma histology (previously known as TCC).  Management of patients with this condition focuses on prevention of disease progression, since effectiveness of salvage therapies for patients with systemic urothelial carcinoma is currently extremely limited.  Indeed, we discussed that approximately 15-25% of all-comers with non-muscle invasive disease will progress to muscle-invasive pathology.  I explained that muscle-invasion is considered the gateway to metastatic progression; however, a small but real percentage of patients can progress to systemic disease without ever demonstrating evidence of muscle-invasive pathology.     The patient tolerated today's instillation well.     He will return next month for his next instillation of maintenance intravesical gemcitabine.     I answered all his questions.     I encouraged him or any of his family members to call or email me with questions and/or concerns.     I spent 20 minutes with the patient of which more than half was spent in coordinating the patient's care as well as in direct consultation with the patient in regards to our treatment and plan.

## 2018-11-19 ENCOUNTER — OFFICE VISIT (OUTPATIENT)
Dept: UROLOGY | Facility: CLINIC | Age: 78
End: 2018-11-19
Payer: MEDICARE

## 2018-11-19 VITALS
RESPIRATION RATE: 15 BRPM | HEIGHT: 71 IN | WEIGHT: 202 LBS | BODY MASS INDEX: 28.28 KG/M2 | HEART RATE: 72 BPM | SYSTOLIC BLOOD PRESSURE: 136 MMHG | DIASTOLIC BLOOD PRESSURE: 76 MMHG

## 2018-11-19 DIAGNOSIS — C67.8 MALIGNANT NEOPLASM OF OVERLAPPING SITES OF BLADDER: Primary | ICD-10-CM

## 2018-11-19 DIAGNOSIS — N18.4 CKD (CHRONIC KIDNEY DISEASE), STAGE IV: ICD-10-CM

## 2018-11-19 DIAGNOSIS — C68.0 URETHRAL CANCER: ICD-10-CM

## 2018-11-19 PROCEDURE — 99213 OFFICE O/P EST LOW 20 MIN: CPT | Mod: S$PBB,25,, | Performed by: UROLOGY

## 2018-11-19 PROCEDURE — 99999 PR PBB SHADOW E&M-EST. PATIENT-LVL III: CPT | Mod: PBBFAC,,, | Performed by: UROLOGY

## 2018-11-19 PROCEDURE — 99213 OFFICE O/P EST LOW 20 MIN: CPT | Mod: PBBFAC,25 | Performed by: UROLOGY

## 2018-11-19 PROCEDURE — 51720 TREATMENT OF BLADDER LESION: CPT | Mod: PBBFAC | Performed by: UROLOGY

## 2018-11-19 PROCEDURE — 51720 TREATMENT OF BLADDER LESION: CPT | Mod: S$PBB,,, | Performed by: UROLOGY

## 2018-11-19 NOTE — PROGRESS NOTES
"Subjective:       Patient ID: Connor Marie is a 78 y.o. male.    Chief Complaint: malignant neoplasm of overlapping sites of bladder (Gemcitabine)      HPI: Connor Marie is a 78 y.o. White male who returns today in follow-up for management of his recurrent non-muscle invasive bladder cancer.    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:    9/2014: Right nephroureterectomy for high-grade Ta UTUC  7/2015: High-grade Ta bladder cancer  11/2015: induction course #1 (6/6) BCG  Early 2016: maintenance course of BCG (3/3)  4/2016: PUNLMP  6-7/2016: induction course of BCG (4/6)  7/2016: low-grade Ta bladder cancer  10/2016: multifocal HGTa bladder cancer  1/2017: HGTa bladder cancer  5/2017: LGTa bladder cancer  6/2017: HGTa bladder cancer  7-8/2017: repeat induction BCG  8/2017-3/2018: monthly BCG  4/2018: recurrent HGTa disease  7/2018: recurrent, multifocal high-grade urothelial cancer, now involving the penile urethra  7/2018 (by me): multifocal low-grade Ta bladder cancer  9-10/2018: Induction gemcitabine    8/2016-3/2017: Patient has received BCG every month once a month for eight consecutive months.    The patient has a significant history of smoking, having quit in 2014.    The patient returns today to continue a maintenance course of intravesical gemcitabine.    The patient tolerated last week's instillation well.    He returns today for 2/10 instillation.    He is doing well without complaints.    Review of patient's allergies indicates:   Allergen Reactions    Penicillins      [employer] "HAD A MILD REACTION"  "Unsure if I am still allergic but I do not want to take the chance."       Current Outpatient Medications   Medication Sig Dispense Refill    albuterol (PROVENTIL) 2.5 mg /3 mL (0.083 %) nebulizer solution Take 2.5 mg by nebulization every 6 (six) hours as needed for Wheezing. Rescue      docusate sodium (COLACE) 100 MG capsule Take 1 " capsule (100 mg total) by mouth 2 (two) times daily.  0    fluticasone-vilanterol (BREO) 100-25 mcg/dose diskus inhaler Inhale 1 puff into the lungs.      oxybutynin (DITROPAN) 5 MG Tab Take 1 tablet (5 mg total) by mouth 3 (three) times daily. 30 tablet 1    SITagliptan (JANUVIA) 25 MG Tab Take 25 mg by mouth.      tamsulosin (FLOMAX) 0.4 mg Cp24 Take 0.4 mg by mouth once daily.   0     Current Facility-Administered Medications   Medication Dose Route Frequency Provider Last Rate Last Dose    NON FORMULARY MEDICATION 2,000 mg  2,000 mg Intravesical 1 time in Clinic/HOD Ernesto Duckworth MD           Past Medical History:   Diagnosis Date    Allergy     Bladder cancer     COPD (chronic obstructive pulmonary disease)     Diabetes mellitus     Disorder of kidney and ureter     Kidney mass     Mass of upper lobe of right lung 7/13/2017    Panlobular emphysema 7/13/2017    SOB (shortness of breath)     Spontaneous pneumothorax 7/13/2017       Past Surgical History:   Procedure Laterality Date    BACK SURGERY      BLADDER SURGERY      cataracts      CHOLECYSTECTOMY      CYSTOSCOPY      NEPHRECTOMY      PROSTATECTOMY-TRANSURETHRAL; cysto bladder biopsy; left retrograde pyelogram Left 6/12/2017    [facility]    RESECTION,NEOPLASM,BLADDER,TRANSURETHRAL N/A 7/17/2018    [facility]ST [facility]LR    rt nephrectomy      TUMOR-BLADDER-TRANSURETHRAL RESECTION  6/12/2017    [facility] OR    TURBT         Family History   Problem Relation Age of Onset    Colon cancer Mother     Stroke Father     Diabetes Father     Brain cancer Sister     Scleroderma Sister     Leukemia Brother     Prostate cancer Brother        Review of Systems    Review of Systems   Constitutional: Negative for fever, chills, activity change, appetite change and unexpected weight change.   HENT: Negative for congestion, nosebleeds,  sneezing, sore throat and trouble swallowing.    Eyes: Negative for pain, discharge, redness and visual disturbance.   Respiratory: Negative for cough, choking, chest tightness and shortness of breath.    Cardiovascular: Negative for chest pain, palpitations and leg swelling.   Gastrointestinal: Negative for nausea, vomiting, abdominal pain, diarrhea, blood in stool, abdominal distention and anal bleeding.  Genitourinary: As documented per HPI   Endocrine: Negative for cold intolerance, heat intolerance, polydipsia, polyphagia and polyuria.   Musculoskeletal: Negative for myalgias, gait problem, neck pain and neck stiffness.   Skin: Negative for color change, pallor, rash and wound.   Allergic/Immunologic: Negative for immunocompromised state.   Neurological: Negative for seizures, facial asymmetry, speech difficulty, weakness and light-headedness.   Hematological: Negative for adenopathy. Does not bruise/bleed easily.   Psychiatric/Behavioral: Negative for hallucinations, behavioral problems, self-injury and agitation. The patient is not hyperactive.    All other systems were reviewed and were negative.      Objective:     Vitals:    11/19/18 0855   BP: 136/76   Pulse: 72   Resp: 15     Physical Exam   Vitals reviewed.  Constitutional: He is oriented to person, place, and time. He appears well-developed and well-nourished. No distress.   HENT:   Head: Normocephalic and atraumatic.   Right Ear: External ear normal.   Left Ear: External ear normal.   Nose: Nose normal.   Eyes: EOM are normal. Pupils are equal, round, and reactive to light. Right eye exhibits no discharge. Left eye exhibits no discharge.   Neck: Normal range of motion. Neck supple. No tracheal deviation present. No thyroid enlargement or tenderness.  Cardiovascular: Regular rhythm and intact distal pulses. No signs of peripheral edema.   Pulmonary/Chest: Effort normal and breath sounds normal. No stridor. No respiratory distress. He has no wheezes.    Abdominal: Soft. Bowel sounds are normal. He exhibits no distension. There is no tenderness. Hernia confirmed negative in the right inguinal area and confirmed negative in the left inguinal area. No hepatic or splenic enlargement. Healed nephroureterectomy scars.  Genitourinary: Penis normal. Right testis shows no mass, no swelling and no tenderness. Right testis is descended. Left testis shows no mass, no swelling and no tenderness. Left testis is descended. Circumcised. No phimosis or hypospadias.   SHANNON: Deferred.  Musculoskeletal: Normal range of motion. He exhibits no edema.   Neurological: He is alert and oriented to person, place, and time. He exhibits normal muscle tone. Coordination normal.   Skin: Skin is warm. No rash noted.   Lymphatic: No palpable lymph nodes.  Psychiatric: He has a normal mood and affect. His behavior is normal. Judgment and thought content normal.    The patient's genitalia was prepped and draped in the usual sterile fashion. A Rodgers catheter was inserted, and the bladder was drained. Intravesical gemcitabine was then administered intravesically via the Rodgers catheter. The Rodgers was removed, and the patient was instructed to hold the gemcitabine intravesically for as long as possible, not greater than 2 hours.    No results found for: PSA  Lab Results   Component Value Date    CREATININE 2.4 (H) 07/09/2018     Lab Results   Component Value Date    EGFRNONAA 25.1 (A) 07/09/2018     Lab Results   Component Value Date    ESTGFRAFRICA 29.0 (A) 07/09/2018     I personally reviewed all the patient's films.    Assessment:       1. Malignant neoplasm of overlapping sites of bladder    2. Urethral cancer    3. CKD (chronic kidney disease), stage IV        Plan:     Connor was seen today for malignant neoplasm of overlapping sites of bladder.    Diagnoses and all orders for this visit:    Malignant neoplasm of overlapping sites of bladder    Urethral cancer    CKD (chronic kidney disease),  stage IV    The patient returns today to begin a maintenance course of intravesical gemcitabine for his recurrent NMIBC.     I had a lengthy discussion again with the patient regarding implications of her diagnosis of non muscle-invasive urothelial carcinoma of the bladder, i.e, bladder cancer.     I explained that individuals with non-muscle invasive bladder cancer account for 75-80% of those with bladder cancer.  Nearly all of such patients exhibit urothelial carcinoma histology (previously known as TCC).  Management of patients with this condition focuses on prevention of disease progression, since effectiveness of salvage therapies for patients with systemic urothelial carcinoma is currently extremely limited.  Indeed, we discussed that approximately 15-25% of all-comers with non-muscle invasive disease will progress to muscle-invasive pathology.  I explained that muscle-invasion is considered the gateway to metastatic progression; however, a small but real percentage of patients can progress to systemic disease without ever demonstrating evidence of muscle-invasive pathology.     The patient tolerated today's instillation well.     He will return next month for his next instillation of maintenance intravesical gemcitabine.     I answered all his questions.     I encouraged him or any of his family members to call or email me with questions and/or concerns.     I spent 20 minutes with the patient of which more than half was spent in coordinating the patient's care as well as in direct consultation with the patient in regards to our treatment and plan.

## 2018-12-19 ENCOUNTER — OFFICE VISIT (OUTPATIENT)
Dept: UROLOGY | Facility: CLINIC | Age: 78
End: 2018-12-19
Payer: MEDICARE

## 2018-12-19 VITALS
HEART RATE: 98 BPM | WEIGHT: 205 LBS | SYSTOLIC BLOOD PRESSURE: 143 MMHG | BODY MASS INDEX: 28.59 KG/M2 | DIASTOLIC BLOOD PRESSURE: 78 MMHG

## 2018-12-19 DIAGNOSIS — N18.30 CKD (CHRONIC KIDNEY DISEASE), STAGE III: ICD-10-CM

## 2018-12-19 DIAGNOSIS — C67.8 MALIGNANT NEOPLASM OF OVERLAPPING SITES OF BLADDER: Primary | ICD-10-CM

## 2018-12-19 DIAGNOSIS — C68.0 URETHRAL CANCER: ICD-10-CM

## 2018-12-19 PROCEDURE — 51720 TREATMENT OF BLADDER LESION: CPT | Mod: PBBFAC | Performed by: UROLOGY

## 2018-12-19 PROCEDURE — 96372 THER/PROPH/DIAG INJ SC/IM: CPT | Mod: PBBFAC,59

## 2018-12-19 PROCEDURE — 99999 PR PBB SHADOW E&M-EST. PATIENT-LVL II: CPT | Mod: PBBFAC,,, | Performed by: UROLOGY

## 2018-12-19 PROCEDURE — 99213 OFFICE O/P EST LOW 20 MIN: CPT | Mod: S$PBB,25,, | Performed by: UROLOGY

## 2018-12-19 PROCEDURE — 99212 OFFICE O/P EST SF 10 MIN: CPT | Mod: PBBFAC,25 | Performed by: UROLOGY

## 2018-12-19 PROCEDURE — 51720 TREATMENT OF BLADDER LESION: CPT | Mod: S$PBB,,, | Performed by: UROLOGY

## 2018-12-19 RX ADMIN — GEMCITABINE HYDROCHLORIDE: 1 INJECTION, POWDER, LYOPHILIZED, FOR SOLUTION INTRAVENOUS at 10:12

## 2018-12-19 NOTE — PATIENT INSTRUCTIONS
Understanding Bladder Cancer    The urinary system includes the kidneys, ureters, bladder, and urethra. It makes, stores, and gets rid of liquid waste called urine. The two kidneys filter blood to collect waste and make urine. The ureters are small tubes that carry urine from each kidney to the bladder. The bladder is where urine is stored before it leaves the body. The urethra is the tube urine goes through to leave the body.   Bladder cancer means that certain cells in the bladder have changed in ways that aren't normal.  When bladder cancer forms  Cancer is a disease that causes cells to change and multiply out of control. The multiplying cells may form a lump of tissue (tumor). With time, the cancer cells destroy healthy tissue. They may spread to other parts of the body. Why some cells become cancerous is not always clear. But bladder cancer is strongly linked to cigarette smoking. The longer a person smokes and the more a person smokes, the greater that persons chances of developing bladder cancer.  Types of cancer that may form  Bladder cancer may grow in different ways:  · Papillary tumors stick out from the bladder lining on a stalk. They tend to grow into the bladder cavity, away from the bladder wall, instead of deeper into the layers of the bladder wall.  · Flat tumors do not stick out from the bladder lining. These tumors are much more likely than papillary tumors to grow deeper into the layers of the bladder wall.  · Carcinoma in situ (CIS) is a cancerous patch of cells that is only in the inner layer of the bladder lining and has not spread to deeper tissue. The patch may look almost normal or may look inflamed.    Each type of tumor can be present in one or more areas of the bladder, and more than one type can be present at the same time.  Date Last Reviewed: 2/17/2016  © 8739-6196 [employer]. [address] [place] [address], [place] [address]. All rights reserved. This information is not  intended as a substitute for professional medical care. Always follow your healthcare professional's instructions.

## 2018-12-19 NOTE — PROGRESS NOTES
"Subjective:       Patient ID: Connor Marie is a 78 y.o. male.    Chief Complaint: Follow-up (gemcitabine 3/10)      HPI: Connor Marie is a 78 y.o. White male who returns today in follow-up for management of his recurrent non-muscle invasive bladder cancer.    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:    9/2014: Right nephroureterectomy for high-grade Ta UTUC  7/2015: High-grade Ta bladder cancer  11/2015: induction course #1 (6/6) BCG  Early 2016: maintenance course of BCG (3/3)  4/2016: PUNLMP  6-7/2016: induction course of BCG (4/6)  7/2016: low-grade Ta bladder cancer  10/2016: multifocal HGTa bladder cancer  1/2017: HGTa bladder cancer  5/2017: LGTa bladder cancer  6/2017: HGTa bladder cancer  7-8/2017: repeat induction BCG  8/2017-3/2018: monthly BCG  4/2018: recurrent HGTa disease  7/2018: recurrent, multifocal high-grade urothelial cancer, now involving the penile urethra  7/2018 (by me): multifocal low-grade Ta bladder cancer  9-10/2018: Induction gemcitabine    8/2016-3/2017: Patient has received BCG every month once a month for eight consecutive months.    The patient has a significant history of smoking, having quit in 2014.    The patient returns today to continue a maintenance course of intravesical gemcitabine.    The patient tolerated last week's instillation well.    He returns today for 3/10 instillation.    He is doing well without complaints.    Review of patient's allergies indicates:   Allergen Reactions    Penicillins      [employer] "H[employer] A MILD REACTION"  "Unsure if I am still allergic but I do not want to take the chance."       Current Outpatient Medications   Medication Sig Dispense Refill    albuterol (PROVENTIL) 2.5 mg /3 mL (0.083 %) nebulizer solution Take 2.5 mg by nebulization every 6 (six) hours as needed for Wheezing. Rescue      docusate sodium (COLACE) 100 MG capsule Take 1 capsule (100 mg total) by mouth 2 " (two) times daily.  0    fluticasone-vilanterol (BREO) 100-25 mcg/dose diskus inhaler Inhale 1 puff into the lungs.      oxybutynin (DITROPAN) 5 MG Tab Take 1 tablet (5 mg total) by mouth 3 (three) times daily. 30 tablet 1    SITagliptan (JANUVIA) 25 MG Tab Take 25 mg by mouth.      tamsulosin (FLOMAX) 0.4 mg Cp24 Take 0.4 mg by mouth once daily.   0     Current Facility-Administered Medications   Medication Dose Route Frequency Provider Last Rate Last Dose    Gemcitabine 2000 mg in 100 ml Sodium Chloride 0.9%   Intravesical 1 time in Clinic/HOD Ernesto Duckworth MD           Past Medical History:   Diagnosis Date    Allergy     Bladder cancer     COPD (chronic obstructive pulmonary disease)     Diabetes mellitus     Disorder of kidney and ureter     Kidney mass     Mass of upper lobe of right lung 7/13/2017    Panlobular emphysema 7/13/2017    SOB (shortness of breath)     Spontaneous pneumothorax 7/13/2017       Past Surgical History:   Procedure Laterality Date    BACK SURGERY      BLADDER SURGERY      cataracts      CHOLECYSTECTOMY      CYSTOSCOPY      NEPHRECTOMY      PROSTATECTOMY-TRANSURETHRAL; cysto bladder biopsy; left retrograde pyelogram Left 6/12/2017    [facility]    RESECTION,NEOPLASM,BLADDER,TRANSURETHRAL N/A 7/17/2018    [facility]ST FLR    rt nephrectomy      TUMOR-BLADDER-TRANSURETHRAL RESECTION  6/12/2017    Per[facility] by Bl[facility] at [facility] OR    TURBT         Family History   Problem Relation Age of Onset    Colon cancer Mother     Stroke Father     Diabetes Father     Brain cancer Sister     Scleroderma Sister     Leukemia Brother     Prostate cancer Brother        Review of Systems    Review of Systems   Constitutional: Negative for fever, chills, activity change, appetite change and unexpected weight change.   HENT: Negative for congestion, nosebleeds, sneezing, sore throat and trouble  swallowing.    Eyes: Negative for pain, discharge, redness and visual disturbance.   Respiratory: Negative for cough, choking, chest tightness and shortness of breath.    Cardiovascular: Negative for chest pain, palpitations and leg swelling.   Gastrointestinal: Negative for nausea, vomiting, abdominal pain, diarrhea, blood in stool, abdominal distention and anal bleeding.  Genitourinary: As documented per HPI   Endocrine: Negative for cold intolerance, heat intolerance, polydipsia, polyphagia and polyuria.   Musculoskeletal: Negative for myalgias, gait problem, neck pain and neck stiffness.   Skin: Negative for color change, pallor, rash and wound.   Allergic/Immunologic: Negative for immunocompromised state.   Neurological: Negative for seizures, facial asymmetry, speech difficulty, weakness and light-headedness.   Hematological: Negative for adenopathy. Does not bruise/bleed easily.   Psychiatric/Behavioral: Negative for hallucinations, behavioral problems, self-injury and agitation. The patient is not hyperactive.    All other systems were reviewed and were negative.      Objective:     There were no vitals filed for this visit.  Physical Exam   Vitals reviewed.  Constitutional: He is oriented to person, place, and time. He appears well-developed and well-nourished. No distress.   HENT:   Head: Normocephalic and atraumatic.   Right Ear: External ear normal.   Left Ear: External ear normal.   Nose: Nose normal.   Eyes: EOM are normal. Pupils are equal, round, and reactive to light. Right eye exhibits no discharge. Left eye exhibits no discharge.   Neck: Normal range of motion. Neck supple. No tracheal deviation present. No thyroid enlargement or tenderness.  Cardiovascular: Regular rhythm and intact distal pulses. No signs of peripheral edema.   Pulmonary/Chest: Effort normal and breath sounds normal. No stridor. No respiratory distress. He has no wheezes.   Abdominal: Soft. Bowel sounds are normal. He exhibits  no distension. There is no tenderness. Hernia confirmed negative in the right inguinal area and confirmed negative in the left inguinal area. No hepatic or splenic enlargement. Healed nephroureterectomy scars.  Genitourinary: Penis normal. Right testis shows no mass, no swelling and no tenderness. Right testis is descended. Left testis shows no mass, no swelling and no tenderness. Left testis is descended. Circumcised. No phimosis or hypospadias.   SHANNON: Deferred.  Musculoskeletal: Normal range of motion. He exhibits no edema.   Neurological: He is alert and oriented to person, place, and time. He exhibits normal muscle tone. Coordination normal.   Skin: Skin is warm. No rash noted.   Lymphatic: No palpable lymph nodes.  Psychiatric: He has a normal mood and affect. His behavior is normal. Judgment and thought content normal.    The patient's genitalia was prepped and draped in the usual sterile fashion. A Rodgers catheter was inserted, and the bladder was drained. Intravesical gemcitabine was then administered intravesically via the Rodgers catheter. The Rodgers was removed, and the patient was instructed to hold the gemcitabine intravesically for as long as possible, not greater than 2 hours.    No results found for: PSA  Lab Results   Component Value Date    CREATININE 2.4 (H) 07/09/2018     Lab Results   Component Value Date    EGFRNONAA 25.1 (A) 07/09/2018     Lab Results   Component Value Date    ESTGFRAFRICA 29.0 (A) 07/09/2018     I personally reviewed all the patient's films.    Assessment:       1. Malignant neoplasm of overlapping sites of bladder    2. Urethral cancer    3. CKD (chronic kidney disease), stage III        Plan:     Connor was seen today for follow-up.    Diagnoses and all orders for this visit:    Malignant neoplasm of overlapping sites of bladder  -     Gemcitabine 2000 mg in 100 ml Sodium Chloride 0.9%    Urethral cancer    CKD (chronic kidney disease), stage III    Other orders  -     NON  FORMULARY MEDICATION 2,000 mg    The patient returns today to begin a maintenance course of intravesical gemcitabine for his recurrent NMIBC.     I had a lengthy discussion again with the patient regarding implications of her diagnosis of non muscle-invasive urothelial carcinoma of the bladder, i.e, bladder cancer.     I explained that individuals with non-muscle invasive bladder cancer account for 75-80% of those with bladder cancer.  Nearly all of such patients exhibit urothelial carcinoma histology (previously known as TCC).  Management of patients with this condition focuses on prevention of disease progression, since effectiveness of salvage therapies for patients with systemic urothelial carcinoma is currently extremely limited.  Indeed, we discussed that approximately 15-25% of all-comers with non-muscle invasive disease will progress to muscle-invasive pathology.  I explained that muscle-invasion is considered the gateway to metastatic progression; however, a small but real percentage of patients can progress to systemic disease without ever demonstrating evidence of muscle-invasive pathology.     The patient tolerated today's instillation well.     He will return next month for his next instillation of maintenance intravesical gemcitabine.     I answered all his questions.     I encouraged him or any of his family members to call or email me with questions and/or concerns.     I spent 20 minutes with the patient of which more than half was spent in coordinating the patient's care as well as in direct consultation with the patient in regards to our treatment and plan.

## 2019-01-16 ENCOUNTER — OFFICE VISIT (OUTPATIENT)
Dept: UROLOGY | Facility: CLINIC | Age: 79
End: 2019-01-16
Payer: MEDICARE

## 2019-01-16 ENCOUNTER — LAB VISIT (OUTPATIENT)
Dept: LAB | Facility: HOSPITAL | Age: 79
End: 2019-01-16
Attending: UROLOGY
Payer: MEDICARE

## 2019-01-16 VITALS — BODY MASS INDEX: 28.17 KG/M2 | WEIGHT: 202 LBS

## 2019-01-16 DIAGNOSIS — C67.8 MALIGNANT NEOPLASM OF OVERLAPPING SITES OF BLADDER: Primary | ICD-10-CM

## 2019-01-16 DIAGNOSIS — R39.9 UTI SYMPTOMS: ICD-10-CM

## 2019-01-16 DIAGNOSIS — R82.90 ABNORMAL URINALYSIS: ICD-10-CM

## 2019-01-16 DIAGNOSIS — R39.9 UTI SYMPTOMS: Primary | ICD-10-CM

## 2019-01-16 DIAGNOSIS — N18.4 CKD (CHRONIC KIDNEY DISEASE), STAGE IV: ICD-10-CM

## 2019-01-16 PROCEDURE — 99212 OFFICE O/P EST SF 10 MIN: CPT | Mod: PBBFAC | Performed by: UROLOGY

## 2019-01-16 PROCEDURE — 99213 PR OFFICE/OUTPT VISIT, EST, LEVL III, 20-29 MIN: ICD-10-PCS | Mod: S$PBB,,, | Performed by: UROLOGY

## 2019-01-16 PROCEDURE — 87086 URINE CULTURE/COLONY COUNT: CPT

## 2019-01-16 PROCEDURE — 99213 OFFICE O/P EST LOW 20 MIN: CPT | Mod: S$PBB,,, | Performed by: UROLOGY

## 2019-01-16 PROCEDURE — 99999 PR PBB SHADOW E&M-EST. PATIENT-LVL II: CPT | Mod: PBBFAC,,, | Performed by: UROLOGY

## 2019-01-16 PROCEDURE — 87088 URINE BACTERIA CULTURE: CPT

## 2019-01-16 PROCEDURE — 99999 PR PBB SHADOW E&M-EST. PATIENT-LVL II: ICD-10-PCS | Mod: PBBFAC,,, | Performed by: UROLOGY

## 2019-01-16 RX ORDER — SULFAMETHOXAZOLE AND TRIMETHOPRIM 800; 160 MG/1; MG/1
1 TABLET ORAL 2 TIMES DAILY
Qty: 20 TABLET | Refills: 0 | Status: SHIPPED | OUTPATIENT
Start: 2019-01-16 | End: 2019-01-26

## 2019-01-16 NOTE — PROGRESS NOTES
"Subjective:       Patient ID: Connor Marie is a 78 y.o. male.    Chief Complaint: Follow-up (gemcitabine)      HPI: Connor Marie is a 78 y.o. White male who returns today in follow-up for management of his recurrent non-muscle invasive bladder cancer.    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:    9/2014: Right nephroureterectomy for high-grade Ta UTUC  7/2015: High-grade Ta bladder cancer  11/2015: induction course #1 (6/6) BCG  Early 2016: maintenance course of BCG (3/3)  4/2016: PUNLMP  6-7/2016: induction course of BCG (4/6)  7/2016: low-grade Ta bladder cancer  10/2016: multifocal HGTa bladder cancer  1/2017: HGTa bladder cancer  5/2017: LGTa bladder cancer  6/2017: HGTa bladder cancer  7-8/2017: repeat induction BCG  8/2017-3/2018: monthly BCG  4/2018: recurrent HGTa disease  7/2018: recurrent, multifocal high-grade urothelial cancer, now involving the penile urethra  7/2018 (by me): multifocal low-grade Ta bladder cancer  9-10/2018: Induction gemcitabine    8/2016-3/2017: Patient has received BCG every month once a month for eight consecutive months.    The patient has a significant history of smoking, having quit in 2014.    The patient returns today to continue a maintenance course of intravesical gemcitabine.    The patient tolerated last week's instillation well.    He is doing well without complaints, however his urine today is nitrite positive.    Review of patient's allergies indicates:   Allergen Reactions    Penicillins      [employer] "H[employer] A MILD REACTION"  "Unsure if I am still allergic but I do not want to take the chance."       Current Outpatient Medications   Medication Sig Dispense Refill    albuterol (PROVENTIL) 2.5 mg /3 mL (0.083 %) nebulizer solution Take 2.5 mg by nebulization every 6 (six) hours as needed for Wheezing. Rescue      docusate sodium (COLACE) 100 MG capsule Take 1 capsule (100 mg total) by mouth 2 " (two) times daily.  0    fluticasone-vilanterol (BREO) 100-25 mcg/dose diskus inhaler Inhale 1 puff into the lungs.      oxybutynin (DITROPAN) 5 MG Tab Take 1 tablet (5 mg total) by mouth 3 (three) times daily. 30 tablet 1    SITagliptan (JANUVIA) 25 MG Tab Take 25 mg by mouth.      sulfamethoxazole-trimethoprim 800-160mg (BACTRIM DS) 800-160 mg Tab Take 1 tablet by mouth 2 (two) times daily. for 10 days 20 tablet 0    tamsulosin (FLOMAX) 0.4 mg Cp24 Take 0.4 mg by mouth once daily.   0     Current Facility-Administered Medications   Medication Dose Route Frequency Provider Last Rate Last Dose    sodium chloride 0.9% 100 mL with gemcitabine (GEMZAR) 2,000 mg   Intravesical 1 time in Clinic/HOD Ernesto Duckworth MD           Past Medical History:   Diagnosis Date    Allergy     Bladder cancer     COPD (chronic obstructive pulmonary disease)     Diabetes mellitus     Disorder of kidney and ureter     Kidney mass     Mass of upper lobe of right lung 7/13/2017    Panlobular emphysema 7/13/2017    SOB (shortness of breath)     Spontaneous pneumothorax 7/13/2017       Past Surgical History:   Procedure Laterality Date    BACK SURGERY      BLADDER SURGERY      cataracts      CHOLECYSTECTOMY      CYSTOSCOPY      NEPHRECTOMY      PROSTATECTOMY-TRANSURETHRAL; cysto bladder biopsy; left retrograde pyelogram Left 6/12/2017    [facility]    RESECTION,NEOPLASM,BLADDER,TRANSURETHRAL N/A 7/17/2018    [facility] 1ST FLR    rt nephrectomy      TUMOR-BLADDER-TRANSURETHRAL RESECTION  6/12/2017    [facility] by [facility] at [facility] OR    TURBT         Family History   Problem Relation Age of Onset    Colon cancer Mother     Stroke Father     Diabetes Father     Brain cancer Sister     Scleroderma Sister     Leukemia Brother     Prostate cancer Brother        Review of Systems    Review of Systems   Constitutional: Negative for  fever, chills, activity change, appetite change and unexpected weight change.   HENT: Negative for congestion, nosebleeds, sneezing, sore throat and trouble swallowing.    Eyes: Negative for pain, discharge, redness and visual disturbance.   Respiratory: Negative for cough, choking, chest tightness and shortness of breath.    Cardiovascular: Negative for chest pain, palpitations and leg swelling.   Gastrointestinal: Negative for nausea, vomiting, abdominal pain, diarrhea, blood in stool, abdominal distention and anal bleeding.  Genitourinary: As documented per HPI   Endocrine: Negative for cold intolerance, heat intolerance, polydipsia, polyphagia and polyuria.   Musculoskeletal: Negative for myalgias, gait problem, neck pain and neck stiffness.   Skin: Negative for color change, pallor, rash and wound.   Allergic/Immunologic: Negative for immunocompromised state.   Neurological: Negative for seizures, facial asymmetry, speech difficulty, weakness and light-headedness.   Hematological: Negative for adenopathy. Does not bruise/bleed easily.   Psychiatric/Behavioral: Negative for hallucinations, behavioral problems, self-injury and agitation. The patient is not hyperactive.    All other systems were reviewed and were negative.      Objective:     There were no vitals filed for this visit.  Physical Exam   Vitals reviewed.  Constitutional: He is oriented to person, place, and time. He appears well-developed and well-nourished. No distress.   HENT:   Head: Normocephalic and atraumatic.   Right Ear: External ear normal.   Left Ear: External ear normal.   Nose: Nose normal.   Eyes: EOM are normal. Pupils are equal, round, and reactive to light. Right eye exhibits no discharge. Left eye exhibits no discharge.   Neck: Normal range of motion. Neck supple. No tracheal deviation present. No thyroid enlargement or tenderness.  Cardiovascular: Regular rhythm and intact distal pulses. No signs of peripheral edema.    Pulmonary/Chest: Effort normal and breath sounds normal. No stridor. No respiratory distress. He has no wheezes.   Abdominal: Soft. Bowel sounds are normal. He exhibits no distension. There is no tenderness. Hernia confirmed negative in the right inguinal area and confirmed negative in the left inguinal area. No hepatic or splenic enlargement. Healed nephroureterectomy scars.  Genitourinary: Penis normal. Right testis shows no mass, no swelling and no tenderness. Right testis is descended. Left testis shows no mass, no swelling and no tenderness. Left testis is descended. Circumcised. No phimosis or hypospadias.   SHANNON: Deferred.  Musculoskeletal: Normal range of motion. He exhibits no edema.   Neurological: He is alert and oriented to person, place, and time. He exhibits normal muscle tone. Coordination normal.   Skin: Skin is warm. No rash noted.   Lymphatic: No palpable lymph nodes.  Psychiatric: He has a normal mood and affect. His behavior is normal. Judgment and thought content normal.    No results found for: PSA  Lab Results   Component Value Date    CREATININE 2.4 (H) 07/09/2018     Lab Results   Component Value Date    EGFRNONAA 25.1 (A) 07/09/2018     Lab Results   Component Value Date    ESTGFRAFRICA 29.0 (A) 07/09/2018     I personally reviewed all the patient's films.    Assessment:       1. Malignant neoplasm of overlapping sites of bladder    2. CKD (chronic kidney disease), stage IV    3. Abnormal urinalysis        Plan:     Connor was seen today for follow-up.    Diagnoses and all orders for this visit:    Malignant neoplasm of overlapping sites of bladder  -     sodium chloride 0.9% 100 mL with gemcitabine (GEMZAR) 2,000 mg    CKD (chronic kidney disease), stage IV    Abnormal urinalysis    Other orders  -     NON FORMULARY MEDICATION 2,000 mg  -     sulfamethoxazole-trimethoprim 800-160mg (BACTRIM DS) 800-160 mg Tab; Take 1 tablet by mouth 2 (two) times daily. for 10 days    The patient returns  today to begin a maintenance course of intravesical gemcitabine for his recurrent NMIBC.    His urine was concerning for infection, so we held his instillation today.    We will send a urine culture and start him on Bactrim pending sensitivities.    We will plan on resuming his maintenance course after his next surveillance cystoscopy in early February.     I answered all his questions.     I encouraged him or any of his family members to call or email me with questions and/or concerns.     I spent 20 minutes with the patient of which more than half was spent in coordinating the patient's care as well as in direct consultation with the patient in regards to our treatment and plan.

## 2019-01-16 NOTE — PATIENT INSTRUCTIONS
Urinary Tract Infections in Men    Urinary tract infections (UTIs) are most often caused by bacteria that invade the urinary tract. The bacteria may come from outside the body. Or they may travel from the skin outside of rectum into the urethra. Pain in or around the urinary tract is a common symptom for most UTIs. But the only way to know for sure if you have a UTI is to have a urinalysis and urine culture.   Types of UTIs  · Cystitis: This is a bladder infection and is often linked to a blockage from an enlarged prostate. You may have an urgent or frequent need to urinate, and bloody urine. Treatment includes antibiotics and medicine to relax or shrink the prostate. Sometimes, surgery is needed.  · Urethritis: This is an infection of the urethra. You may have a discharge from the urethra or burning when you urinate. You may also have pain in the urethra or penis. It is treated with antibiotics.  · Prostatitis: This is an inflammation or infection of the prostate. You may have an urgent or frequent need to urinate, fever, or burning when you urinate. Or you may have a tender prostate, or a vague feeling of pressure. Prostatitis is treated with a range of medicines, depending on the cause.  · Pyelonephritis: This is a kidney infection. If not treated, it can be serious and damage your kidneys. In severe cases you may be hospitalized. You may have a fever and upper back pain.  Treating a UTI  · Medicine: Most UTIs are treated with antibiotics. These kill the bacteria. The length of time you need to take them depends on the type of infection. Take antibiotics exactly as directed until all of the medicine is gone. If you don't, the infection may not go away and may become harder to treat. For certain types of UTIs, you may be given other medicine to help treat your symptoms.  · Lifestyle changes: The lifestyle changes below will help get rid of your current infection. They may also help prevent future UTIs.  ¨ Drink  plenty of fluids such as water, juice, or other caffeine-free drinks. This helps flush bacteria out of your system.  ¨ Empty your bladder when you feel the urge to urinate and before going to sleep. Urine that stays in your bladder promotes infection.  ¨ Use condoms during sex. These help prevent UTIs caused by sexually transmitted bacteria.  ¨ Keep follow-up appointments with your healthcare provider. He or she can may do tests to make sure the infection has cleared. If needed, more treatment can be started.  · Other treatment: Most UTIs respond to medicine. But sometimes a procedure or surgery is needed. This can treat an enlarged prostate, or remove a kidney stone or other blockage. Surgery may also treat problems caused by scarring or long-term infections.  Date Last Reviewed: 1/1/2017  © 5210-3802 [employer]. [address] [place] [address], [place] [address]. All rights reserved. This information is not intended as a substitute for professional medical care. Always follow your healthcare professional's instructions.

## 2019-01-17 LAB — BACTERIA UR CULT: NORMAL

## 2019-02-04 ENCOUNTER — LAB VISIT (OUTPATIENT)
Dept: LAB | Facility: HOSPITAL | Age: 79
End: 2019-02-04
Attending: UROLOGY
Payer: MEDICARE

## 2019-02-04 ENCOUNTER — TELEPHONE (OUTPATIENT)
Dept: UROLOGY | Facility: CLINIC | Age: 79
End: 2019-02-04

## 2019-02-04 DIAGNOSIS — R39.9 UTI SYMPTOMS: ICD-10-CM

## 2019-02-04 DIAGNOSIS — R39.9 UTI SYMPTOMS: Primary | ICD-10-CM

## 2019-02-04 PROCEDURE — 87077 CULTURE AEROBIC IDENTIFY: CPT

## 2019-02-04 PROCEDURE — 87086 URINE CULTURE/COLONY COUNT: CPT

## 2019-02-04 PROCEDURE — 87088 URINE BACTERIA CULTURE: CPT

## 2019-02-04 PROCEDURE — 87186 SC STD MICRODIL/AGAR DIL: CPT

## 2019-02-04 NOTE — TELEPHONE ENCOUNTER
Called pt and told him that I put in a urine culture for him to drop off his urine at his nearest [facility] lab.  Pt verbalized understanding to all.

## 2019-02-06 LAB — BACTERIA UR CULT: NORMAL

## 2019-02-07 RX ORDER — CIPROFLOXACIN 500 MG/1
500 TABLET ORAL 2 TIMES DAILY
Qty: 28 TABLET | Refills: 0 | Status: SHIPPED | OUTPATIENT
Start: 2019-02-07 | End: 2019-02-21

## 2019-04-22 ENCOUNTER — OFFICE VISIT (OUTPATIENT)
Dept: UROLOGY | Facility: CLINIC | Age: 79
End: 2019-04-22
Payer: MEDICARE

## 2019-04-22 VITALS
WEIGHT: 202 LBS | HEART RATE: 78 BPM | SYSTOLIC BLOOD PRESSURE: 139 MMHG | BODY MASS INDEX: 28.28 KG/M2 | RESPIRATION RATE: 15 BRPM | DIASTOLIC BLOOD PRESSURE: 72 MMHG | HEIGHT: 71 IN

## 2019-04-22 DIAGNOSIS — C67.8 MALIGNANT NEOPLASM OF OVERLAPPING SITES OF BLADDER: Primary | ICD-10-CM

## 2019-04-22 DIAGNOSIS — N18.4 CKD (CHRONIC KIDNEY DISEASE), STAGE IV: ICD-10-CM

## 2019-04-22 PROCEDURE — 99999 PR PBB SHADOW E&M-EST. PATIENT-LVL III: ICD-10-PCS | Mod: PBBFAC,,, | Performed by: UROLOGY

## 2019-04-22 PROCEDURE — 51720 TREATMENT OF BLADDER LESION: CPT | Mod: PBBFAC | Performed by: UROLOGY

## 2019-04-22 PROCEDURE — 99213 OFFICE O/P EST LOW 20 MIN: CPT | Mod: PBBFAC | Performed by: UROLOGY

## 2019-04-22 PROCEDURE — 51720 TREATMENT OF BLADDER LESION: CPT | Mod: S$PBB,,, | Performed by: UROLOGY

## 2019-04-22 PROCEDURE — 99213 OFFICE O/P EST LOW 20 MIN: CPT | Mod: S$PBB,25,, | Performed by: UROLOGY

## 2019-04-22 PROCEDURE — 99999 PR PBB SHADOW E&M-EST. PATIENT-LVL III: CPT | Mod: PBBFAC,,, | Performed by: UROLOGY

## 2019-04-22 PROCEDURE — 51720 PR INSTILL, ANTICANCER AGENT, BLADDER: ICD-10-PCS | Mod: S$PBB,,, | Performed by: UROLOGY

## 2019-04-22 PROCEDURE — 99213 PR OFFICE/OUTPT VISIT, EST, LEVL III, 20-29 MIN: ICD-10-PCS | Mod: S$PBB,25,, | Performed by: UROLOGY

## 2019-04-22 NOTE — PROGRESS NOTES
"Subjective:       Patient ID: Connor Marie is a 78 y.o. male.    Chief Complaint: Malignant neoplasm of overlapping sites of bladder (Gemcitabine)      HPI: Connor Marie is a 78 y.o. White male who returns today in follow-up for management of his recurrent non-muscle invasive bladder cancer.    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:    9/2014: Right nephroureterectomy for high-grade Ta UTUC  7/2015: High-grade Ta bladder cancer  11/2015: induction course #1 (6/6) BCG  Early 2016: maintenance course of BCG (3/3)  4/2016: PUNLMP  6-7/2016: induction course of BCG (4/6)  7/2016: low-grade Ta bladder cancer  10/2016: multifocal HGTa bladder cancer  1/2017: HGTa bladder cancer  5/2017: LGTa bladder cancer  6/2017: HGTa bladder cancer  7-8/2017: repeat induction BCG  8/2017-3/2018: monthly BCG  4/2018: recurrent HGTa disease  7/2018: recurrent, multifocal high-grade urothelial cancer, now involving the penile urethra  7/2018 (by me): multifocal low-grade Ta bladder cancer  9-10/2018: Induction gemcitabine    8/2016-3/2017: Patient has received BCG every month once a month for eight consecutive months.    The patient has a significant history of smoking, having quit in 2014.    The patient returns today to continue a maintenance course of intravesical gemcitabine. Today's instillation is 5/10.    His urine today is free of signs of infection.    Review of patient's allergies indicates:   Allergen Reactions    Penicillins      [employer] "H[employer] A MILD REACTION"  "Unsure if I am still allergic but I do not want to take the chance."       Current Outpatient Medications   Medication Sig Dispense Refill    fluticasone-vilanterol (BREO) 100-25 mcg/dose diskus inhaler Inhale 1 puff into the lungs.      oxybutynin (DITROPAN) 5 MG Tab Take 1 tablet (5 mg total) by mouth 3 (three) times daily. 30 tablet 1    SITagliptan (JANUVIA) 25 MG Tab Take 25 mg by " mouth.      tamsulosin (FLOMAX) 0.4 mg Cp24 Take 0.4 mg by mouth once daily.   0    albuterol (PROVENTIL) 2.5 mg /3 mL (0.083 %) nebulizer solution Take 2.5 mg by nebulization every 6 (six) hours as needed for Wheezing. Rescue      docusate sodium (COLACE) 100 MG capsule Take 1 capsule (100 mg total) by mouth 2 (two) times daily.  0     Current Facility-Administered Medications   Medication Dose Route Frequency Provider Last Rate Last Dose    Gemcitabine 2000 mg in Sodium Chloride 0.9% 100 mL   Intravesical 1 time in Clinic/HOD Ernesto Duckworth MD        sodium chloride 0.9% 100 mL with gemcitabine (GEMZAR) 2,000 mg   Intravesical 1 time in Clinic/HOD Ernesto Duckworth MD           Past Medical History:   Diagnosis Date    Allergy     Bladder cancer     COPD (chronic obstructive pulmonary disease)     Diabetes mellitus     Disorder of kidney and ureter     Kidney mass     Mass of upper lobe of right lung 7/13/2017    Panlobular emphysema 7/13/2017    SOB (shortness of breath)     Spontaneous pneumothorax 7/13/2017       Past Surgical History:   Procedure Laterality Date    BACK SURGERY      BLADDER SURGERY      cataracts      CHOLECYSTECTOMY      CYSTOSCOPY      NEPHRECTOMY      PROSTATECTOMY-TRANSURETHRAL; cysto bladder biopsy; left retrograde pyelogram Left 6/12/2017    Performed by [facility] at [facility] OR    RESECTION,NEOPLASM,BLADDER,TRANSURETHRAL N/A 7/17/2018    [facility]    rt nephrectomy      TUMOR-BLADDER-TRANSURETHRAL RESECTION  6/12/2017    Performed by [facility] at [facility] OR    TURBT         Family History   Problem Relation Age of Onset    Colon cancer Mother     Stroke Father     Diabetes Father     Brain cancer Sister     Scleroderma Sister     Leukemia Brother     Prostate cancer Brother        Review of Systems    Review of Systems   Constitutional: Negative for fever, chills, activity change, appetite change  and unexpected weight change.   HENT: Negative for congestion, nosebleeds, sneezing, sore throat and trouble swallowing.    Eyes: Negative for pain, discharge, redness and visual disturbance.   Respiratory: Negative for cough, choking, chest tightness and shortness of breath.    Cardiovascular: Negative for chest pain, palpitations and leg swelling.   Gastrointestinal: Negative for nausea, vomiting, abdominal pain, diarrhea, blood in stool, abdominal distention and anal bleeding.  Genitourinary: As documented per HPI   Endocrine: Negative for cold intolerance, heat intolerance, polydipsia, polyphagia and polyuria.   Musculoskeletal: Negative for myalgias, gait problem, neck pain and neck stiffness.   Skin: Negative for color change, pallor, rash and wound.   Allergic/Immunologic: Negative for immunocompromised state.   Neurological: Negative for seizures, facial asymmetry, speech difficulty, weakness and light-headedness.   Hematological: Negative for adenopathy. Does not bruise/bleed easily.   Psychiatric/Behavioral: Negative for hallucinations, behavioral problems, self-injury and agitation. The patient is not hyperactive.    All other systems were reviewed and were negative.      Objective:     Vitals:    04/22/19 1123   BP: 139/72   Pulse: 78   Resp: 15     Physical Exam   Vitals reviewed.  Constitutional: He is oriented to person, place, and time. He appears well-developed and well-nourished. No distress.   HENT:   Head: Normocephalic and atraumatic.   Right Ear: External ear normal.   Left Ear: External ear normal.   Nose: Nose normal.   Eyes: EOM are normal. Pupils are equal, round, and reactive to light. Right eye exhibits no discharge. Left eye exhibits no discharge.   Neck: Normal range of motion. Neck supple. No tracheal deviation present. No thyroid enlargement or tenderness.  Cardiovascular: Regular rhythm and intact distal pulses. No signs of peripheral edema.   Pulmonary/Chest: Effort normal and  breath sounds normal. No stridor. No respiratory distress. He has no wheezes.   Abdominal: Soft. Bowel sounds are normal. He exhibits no distension. There is no tenderness. Hernia confirmed negative in the right inguinal area and confirmed negative in the left inguinal area. No hepatic or splenic enlargement. Healed nephroureterectomy scars.  Genitourinary: Penis normal. Right testis shows no mass, no swelling and no tenderness. Right testis is descended. Left testis shows no mass, no swelling and no tenderness. Left testis is descended. Circumcised. No phimosis or hypospadias.   SHANNON: Deferred.  Musculoskeletal: Normal range of motion. He exhibits no edema.   Neurological: He is alert and oriented to person, place, and time. He exhibits normal muscle tone. Coordination normal.   Skin: Skin is warm. No rash noted.   Lymphatic: No palpable lymph nodes.  Psychiatric: He has a normal mood and affect. His behavior is normal. Judgment and thought content normal.    No results found for: PSA  Lab Results   Component Value Date    CREATININE 2.4 (H) 07/09/2018     Lab Results   Component Value Date    EGFRNONAA 25.1 (A) 07/09/2018     Lab Results   Component Value Date    ESTGFRAFRICA 29.0 (A) 07/09/2018     I personally reviewed all the patient's films.    Assessment:       1. Malignant neoplasm of overlapping sites of bladder    2. CKD (chronic kidney disease), stage IV        Plan:     Connor was seen today for malignant neoplasm of overlapping sites of bladder.    Diagnoses and all orders for this visit:    Malignant neoplasm of overlapping sites of bladder  -     Gemcitabine 2000 mg in Sodium Chloride 0.9% 100 mL    CKD (chronic kidney disease), stage IV    Other orders  -     NON FORMULARY MEDICATION 2,000 mg    The patient returns today to continue his maintenance course of intravesical gemcitabine for his recurrent NMIBC.    The patient tolerated today's instillation well.    He will return in one month for his  sixth maintenance dose.     I answered all his questions.     I encouraged him or any of his family members to call or email me with questions and/or concerns.     I spent 20 minutes with the patient of which more than half was spent in coordinating the patient's care as well as in direct consultation with the patient in regards to our treatment and plan.

## 2019-04-22 NOTE — PATIENT INSTRUCTIONS
Understanding Bladder Cancer    The urinary system includes the kidneys, ureters, bladder, and urethra. It makes, stores, and gets rid of liquid waste called urine. The two kidneys filter blood to collect waste and make urine. The ureters are small tubes that carry urine from each kidney to the bladder. The bladder is where urine is stored before it leaves the body. The urethra is the tube urine goes through to leave the body.   Bladder cancer means that certain cells in the bladder have changed in ways that aren't normal.  When bladder cancer forms  Cancer is a disease that causes cells to change and multiply out of control. The multiplying cells may form a lump of tissue (tumor). With time, the cancer cells destroy healthy tissue. They may spread to other parts of the body. Why some cells become cancerous is not always clear. But bladder cancer is strongly linked to cigarette smoking. The longer a person smokes and the more a person smokes, the greater that persons chances of developing bladder cancer.  Types of cancer that may form  Bladder cancer may grow in different ways:  · Papillary tumors stick out from the bladder lining on a stalk. They tend to grow into the bladder cavity, away from the bladder wall, instead of deeper into the layers of the bladder wall.  · Flat tumors do not stick out from the bladder lining. These tumors are much more likely than papillary tumors to grow deeper into the layers of the bladder wall.  · Carcinoma in situ (CIS) is a cancerous patch of cells that is only in the inner layer of the bladder lining and has not spread to deeper tissue. The patch may look almost normal or may look inflamed.    Each type of tumor can be present in one or more areas of the bladder, and more than one type can be present at the same time.  Date Last Reviewed: 2/17/2016  © 8915-5578 [employer]. [address] [place] [address], [place] [address]. All rights reserved. This information is not  intended as a substitute for professional medical care. Always follow your healthcare professional's instructions.

## 2019-05-29 ENCOUNTER — OFFICE VISIT (OUTPATIENT)
Dept: UROLOGY | Facility: CLINIC | Age: 79
End: 2019-05-29
Payer: MEDICARE

## 2019-05-29 VITALS
WEIGHT: 196.19 LBS | SYSTOLIC BLOOD PRESSURE: 145 MMHG | HEART RATE: 78 BPM | BODY MASS INDEX: 27.47 KG/M2 | HEIGHT: 71 IN | DIASTOLIC BLOOD PRESSURE: 73 MMHG

## 2019-05-29 DIAGNOSIS — R39.9 LOWER URINARY TRACT SYMPTOMS (LUTS): ICD-10-CM

## 2019-05-29 DIAGNOSIS — N18.4 CKD (CHRONIC KIDNEY DISEASE), STAGE IV: ICD-10-CM

## 2019-05-29 DIAGNOSIS — C67.8 MALIGNANT NEOPLASM OF OVERLAPPING SITES OF BLADDER: Primary | ICD-10-CM

## 2019-05-29 PROCEDURE — 99213 OFFICE O/P EST LOW 20 MIN: CPT | Mod: PBBFAC,25 | Performed by: UROLOGY

## 2019-05-29 PROCEDURE — 51720 TREATMENT OF BLADDER LESION: CPT | Mod: S$PBB,,, | Performed by: UROLOGY

## 2019-05-29 PROCEDURE — 99999 PR PBB SHADOW E&M-EST. PATIENT-LVL III: CPT | Mod: PBBFAC,,, | Performed by: UROLOGY

## 2019-05-29 PROCEDURE — 51720 TREATMENT OF BLADDER LESION: CPT | Mod: PBBFAC | Performed by: UROLOGY

## 2019-05-29 PROCEDURE — 99213 OFFICE O/P EST LOW 20 MIN: CPT | Mod: S$PBB,25,, | Performed by: UROLOGY

## 2019-05-29 PROCEDURE — 96372 THER/PROPH/DIAG INJ SC/IM: CPT | Mod: PBBFAC

## 2019-05-29 PROCEDURE — 99999 PR PBB SHADOW E&M-EST. PATIENT-LVL III: ICD-10-PCS | Mod: PBBFAC,,, | Performed by: UROLOGY

## 2019-05-29 PROCEDURE — 99213 PR OFFICE/OUTPT VISIT, EST, LEVL III, 20-29 MIN: ICD-10-PCS | Mod: S$PBB,25,, | Performed by: UROLOGY

## 2019-05-29 PROCEDURE — 51720 PR INSTILL, ANTICANCER AGENT, BLADDER: ICD-10-PCS | Mod: S$PBB,,, | Performed by: UROLOGY

## 2019-05-29 RX ADMIN — GEMCITABINE HYDROCHLORIDE: 1 INJECTION, POWDER, LYOPHILIZED, FOR SOLUTION INTRAVENOUS at 10:05

## 2019-05-29 NOTE — PATIENT INSTRUCTIONS
Understanding Bladder Cancer    The urinary system includes the kidneys, ureters, bladder, and urethra. It makes, stores, and gets rid of liquid waste called urine. The two kidneys filter blood to collect waste and make urine. The ureters are small tubes that carry urine from each kidney to the bladder. The bladder is where urine is stored before it leaves the body. The urethra is the tube urine goes through to leave the body.   Bladder cancer means that certain cells in the bladder have changed in ways that aren't normal.  When bladder cancer forms  Cancer is a disease that causes cells to change and multiply out of control. The multiplying cells may form a lump of tissue (tumor). With time, the cancer cells destroy healthy tissue. They may spread to other parts of the body. Why some cells become cancerous is not always clear. But bladder cancer is strongly linked to cigarette smoking. The longer a person smokes and the more a person smokes, the greater that persons chances of developing bladder cancer.  Types of cancer that may form  Bladder cancer may grow in different ways:  · Papillary tumors stick out from the bladder lining on a stalk. They tend to grow into the bladder cavity, away from the bladder wall, instead of deeper into the layers of the bladder wall.  · Flat tumors do not stick out from the bladder lining. These tumors are much more likely than papillary tumors to grow deeper into the layers of the bladder wall.  · Carcinoma in situ (CIS) is a cancerous patch of cells that is only in the inner layer of the bladder lining and has not spread to deeper tissue. The patch may look almost normal or may look inflamed.    Each type of tumor can be present in one or more areas of the bladder, and more than one type can be present at the same time.  Date Last Reviewed: 2/17/2016  © 9801-8425 [employer]. [address] [place] [address], [place] [address]. All rights reserved. This information is not  intended as a substitute for professional medical care. Always follow your healthcare professional's instructions.

## 2019-05-29 NOTE — PROGRESS NOTES
"Subjective:       Patient ID: Connor Marie is a 78 y.o. male.    Chief Complaint: No chief complaint on file.      HPI: Connor Marie is a 78 y.o. White male who returns today in follow-up for management of his recurrent non-muscle invasive bladder cancer.    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:    9/2014: Right nephroureterectomy for high-grade Ta UTUC  7/2015: High-grade Ta bladder cancer  11/2015: induction course #1 (6/6) BCG  Early 2016: maintenance course of BCG (3/3)  4/2016: PUNLMP  6-7/2016: induction course of BCG (4/6)  7/2016: low-grade Ta bladder cancer  10/2016: multifocal HGTa bladder cancer  1/2017: HGTa bladder cancer  5/2017: LGTa bladder cancer  6/2017: HGTa bladder cancer  7-8/2017: repeat induction BCG  8/2017-3/2018: monthly BCG  4/2018: recurrent HGTa disease  7/2018: recurrent, multifocal high-grade urothelial cancer, now involving the penile urethra  7/2018 (by me): multifocal low-grade Ta bladder cancer  9-10/2018: Induction gemcitabine    8/2016-3/2017: Patient has received BCG every month once a month for eight consecutive months.    The patient has a significant history of smoking, having quit in 2014.    The patient returns today to continue a maintenance course of intravesical gemcitabine. Today's instillation is 6/10.    His urine today is free of signs of infection; he is doing well.    Review of patient's allergies indicates:   Allergen Reactions    Penicillins      [employer] "H[employer] A MILD REACTION"  "Unsure if I am still allergic but I do not want to take the chance."       Current Outpatient Medications   Medication Sig Dispense Refill    albuterol (PROVENTIL) 2.5 mg /3 mL (0.083 %) nebulizer solution Take 2.5 mg by nebulization every 6 (six) hours as needed for Wheezing. Rescue      docusate sodium (COLACE) 100 MG capsule Take 1 capsule (100 mg total) by mouth 2 (two) times daily.  0    " fluticasone-vilanterol (BREO) 100-25 mcg/dose diskus inhaler Inhale 1 puff into the lungs.      oxybutynin (DITROPAN) 5 MG Tab Take 1 tablet (5 mg total) by mouth 3 (three) times daily. 30 tablet 1    SITagliptan (JANUVIA) 25 MG Tab Take 25 mg by mouth.      tamsulosin (FLOMAX) 0.4 mg Cp24 Take 0.4 mg by mouth once daily.   0     Current Facility-Administered Medications   Medication Dose Route Frequency Provider Last Rate Last Dose    sodium chloride 0.9% 100 mL with gemcitabine (GEMZAR) 2,000 mg   Intravesical 1 time in Clinic/HOD Ernesto Duckworth MD           Past Medical History:   Diagnosis Date    Allergy     Bladder cancer     COPD (chronic obstructive pulmonary disease)     Diabetes mellitus     Disorder of kidney and ureter     Kidney mass     Mass of upper lobe of right lung 7/13/2017    Panlobular emphysema 7/13/2017    SOB (shortness of breath)     Spontaneous pneumothorax 7/13/2017       Past Surgical History:   Procedure Laterality Date    BACK SURGERY      BLADDER SURGERY      cataracts      CHOLECYSTECTOMY      CYSTOSCOPY      NEPHRECTOMY      PROSTATECTOMY-TRANSURETHRAL; cysto bladder biopsy; left retrograde pyelogram Left 6/12/2017    Performed by Blas Brennan [facility] at [facility] [place], operated by [facility] OR    RESECTION,NEOPLASM,BLADDER,TRANSURETHRAL N/A 7/17/2018    Performed by [facility] at [facility] OR 1ST FLR    rt nephrectomy      TUMOR-BLADDER-TRANSURETHRAL RESECTION  6/12/2017    Performed by Blas Brennan MD at [facility] [place], [facility] by [facility] OR    TURBT         Family History   Problem Relation Age of Onset    Colon cancer Mother     Stroke Father     Diabetes Father     Brain cancer Sister     Scleroderma Sister     Leukemia Brother     Prostate cancer Brother        Review of Systems    Review of Systems   Constitutional: Negative for fever, chills, activity change, appetite change and unexpected weight change.   HENT: Negative for congestion, nosebleeds, sneezing, sore throat and trouble swallowing.     Eyes: Negative for pain, discharge, redness and visual disturbance.   Respiratory: Negative for cough, choking, chest tightness and shortness of breath.    Cardiovascular: Negative for chest pain, palpitations and leg swelling.   Gastrointestinal: Negative for nausea, vomiting, abdominal pain, diarrhea, blood in stool, abdominal distention and anal bleeding.  Genitourinary: As documented per HPI   Endocrine: Negative for cold intolerance, heat intolerance, polydipsia, polyphagia and polyuria.   Musculoskeletal: Negative for myalgias, gait problem, neck pain and neck stiffness.   Skin: Negative for color change, pallor, rash and wound.   Allergic/Immunologic: Negative for immunocompromised state.   Neurological: Negative for seizures, facial asymmetry, speech difficulty, weakness and light-headedness.   Hematological: Negative for adenopathy. Does not bruise/bleed easily.   Psychiatric/Behavioral: Negative for hallucinations, behavioral problems, self-injury and agitation. The patient is not hyperactive.    All other systems were reviewed and were negative.      Objective:     Vitals:    05/29/19 0919   BP: (!) 145/73   Pulse: 78     Physical Exam   Vitals reviewed.  Constitutional: He is oriented to person, place, and time. He appears well-developed and well-nourished. No distress.   HENT:   Head: Normocephalic and atraumatic.   Right Ear: External ear normal.   Left Ear: External ear normal.   Nose: Nose normal.   Eyes: EOM are normal. Pupils are equal, round, and reactive to light. Right eye exhibits no discharge. Left eye exhibits no discharge.   Neck: Normal range of motion. Neck supple. No tracheal deviation present. No thyroid enlargement or tenderness.  Cardiovascular: Regular rhythm and intact distal pulses. No signs of peripheral edema.   Pulmonary/Chest: Effort normal and breath sounds normal. No stridor. No respiratory distress. He has no wheezes.   Abdominal: Soft. Bowel sounds are normal. He exhibits  no distension. There is no tenderness. Hernia confirmed negative in the right inguinal area and confirmed negative in the left inguinal area. No hepatic or splenic enlargement. Healed nephroureterectomy scars.  Genitourinary: Penis normal. Right testis shows no mass, no swelling and no tenderness. Right testis is descended. Left testis shows no mass, no swelling and no tenderness. Left testis is descended. Circumcised. No phimosis or hypospadias.   SHANNON: Deferred.  Musculoskeletal: Normal range of motion. He exhibits no edema.   Neurological: He is alert and oriented to person, place, and time. He exhibits normal muscle tone. Coordination normal.   Skin: Skin is warm. No rash noted.   Lymphatic: No palpable lymph nodes.  Psychiatric: He has a normal mood and affect. His behavior is normal. Judgment and thought content normal.    No results found for: PSA  Lab Results   Component Value Date    CREATININE 2.4 (H) 07/09/2018     Lab Results   Component Value Date    EGFRNONAA 25.1 (A) 07/09/2018     Lab Results   Component Value Date    ESTGFRAFRICA 29.0 (A) 07/09/2018     I personally reviewed all the patient's films.    Assessment:       1. Malignant neoplasm of overlapping sites of bladder    2. CKD (chronic kidney disease), stage IV    3. Lower urinary tract symptoms (LUTS)        Plan:     Diagnoses and all orders for this visit:    Malignant neoplasm of overlapping sites of bladder  -     Gemcitabine 2000 mg in Sodium Chloride 0.9% 100 mL    CKD (chronic kidney disease), stage IV    Lower urinary tract symptoms (LUTS)    Other orders  -     NON FORMULARY MEDICATION 2,000 mg    The patient returns today to continue his maintenance course of intravesical gemcitabine for his recurrent NMIBC.    The patient tolerated today's instillation well.    He is due for a surveillance cystoscopy next month. If negative, we can hold his intravesical gemcitabine because of his severe LUTS.    I answered all his questions.     I  encouraged him or any of his family members to call or email me with questions and/or concerns.     I spent 20 minutes with the patient of which more than half was spent in coordinating the patient's care as well as in direct consultation with the patient in regards to our treatment and plan.

## (undated) DEVICE — SYR 30CC LUER LOCK

## (undated) DEVICE — SOL IRR NACL .9% 3000ML

## (undated) DEVICE — GLOVE BIOGEL 7.0

## (undated) DEVICE — EVACUATOR BLADDER UROVAC ADPT

## (undated) DEVICE — ELECTRODE RESECTSCP HI 24FR

## (undated) DEVICE — DRESSING TELFA N ADH 3X8

## (undated) DEVICE — SET Y-TYPE TUR IRRIGATION

## (undated) DEVICE — BAG URINARY DRN 2000ML

## (undated) DEVICE — SYR 10CC LUER LOCK

## (undated) DEVICE — Device

## (undated) DEVICE — URINARY DRAINAGE BAG

## (undated) DEVICE — SOL IRR NACL .9% 1000CC

## (undated) DEVICE — SEE MEDLINE ITEM 157116

## (undated) DEVICE — GLOVE BIOGEL SKINSENSE PI 7.0

## (undated) DEVICE — SET TUR BLADDER IRRIG Y TYPE

## (undated) DEVICE — SET BASIN 48X48IN 6000ML RING

## (undated) DEVICE — SYR CATHETER TIP 60ML

## (undated) DEVICE — BRUSH SCRUB SURGICALW/BETADINE

## (undated) DEVICE — TRAY CYSTO BASIN

## (undated) DEVICE — CATH URTRL OPEN END STR TIP 5F

## (undated) DEVICE — SOL 9P NACL IRR PIC IL

## (undated) DEVICE — KIT ASPIRATION TUBING FOR SP-2

## (undated) DEVICE — ELECTRODE RESECTION BUTTON LRG

## (undated) DEVICE — SET SINGLE BASIN

## (undated) DEVICE — PACK CYSTO